# Patient Record
Sex: MALE | Race: WHITE | NOT HISPANIC OR LATINO | Employment: OTHER | URBAN - METROPOLITAN AREA
[De-identification: names, ages, dates, MRNs, and addresses within clinical notes are randomized per-mention and may not be internally consistent; named-entity substitution may affect disease eponyms.]

---

## 2018-04-08 PROBLEM — I49.1 APC (ATRIAL PREMATURE CONTRACTIONS): Status: ACTIVE | Noted: 2018-04-08

## 2018-04-08 PROBLEM — I10 HYPERTENSION, ESSENTIAL: Status: ACTIVE | Noted: 2018-04-08

## 2018-04-08 PROBLEM — I47.10 SVT (SUPRAVENTRICULAR TACHYCARDIA) (CMS/HCC): Status: ACTIVE | Noted: 2018-04-08

## 2018-04-08 PROBLEM — E78.00 HYPERCHOLESTEROLEMIA: Status: ACTIVE | Noted: 2018-04-08

## 2018-04-08 PROBLEM — I35.2 NONRHEUMATIC AORTIC INSUFFICIENCY WITH AORTIC STENOSIS: Status: ACTIVE | Noted: 2018-04-08

## 2018-04-08 PROBLEM — I25.10 CORONARY ARTERY DISEASE INVOLVING NATIVE CORONARY ARTERY OF NATIVE HEART WITHOUT ANGINA PECTORIS: Status: ACTIVE | Noted: 2018-04-08

## 2018-04-10 ENCOUNTER — OFFICE VISIT (OUTPATIENT)
Dept: CARDIOLOGY | Facility: CLINIC | Age: 73
End: 2018-04-10
Attending: INTERNAL MEDICINE
Payer: COMMERCIAL

## 2018-04-10 VITALS
WEIGHT: 157 LBS | SYSTOLIC BLOOD PRESSURE: 118 MMHG | DIASTOLIC BLOOD PRESSURE: 62 MMHG | OXYGEN SATURATION: 98 % | HEART RATE: 90 BPM | RESPIRATION RATE: 18 BRPM

## 2018-04-10 DIAGNOSIS — I25.10 CORONARY ARTERY DISEASE INVOLVING NATIVE CORONARY ARTERY OF NATIVE HEART WITHOUT ANGINA PECTORIS: ICD-10-CM

## 2018-04-10 DIAGNOSIS — E78.00 HYPERCHOLESTEROLEMIA: ICD-10-CM

## 2018-04-10 DIAGNOSIS — I35.2 NONRHEUMATIC AORTIC INSUFFICIENCY WITH AORTIC STENOSIS: ICD-10-CM

## 2018-04-10 DIAGNOSIS — I10 HYPERTENSION, ESSENTIAL: ICD-10-CM

## 2018-04-10 DIAGNOSIS — I47.10 SVT (SUPRAVENTRICULAR TACHYCARDIA) (CMS/HCC): ICD-10-CM

## 2018-04-10 PROBLEM — I35.1 AORTIC VALVE REGURGITATION: Status: ACTIVE | Noted: 2018-04-08

## 2018-04-10 PROBLEM — E78.5 DYSLIPIDEMIA: Status: ACTIVE | Noted: 2018-04-10

## 2018-04-10 PROCEDURE — 99214 OFFICE O/P EST MOD 30 MIN: CPT | Performed by: INTERNAL MEDICINE

## 2018-04-10 PROCEDURE — 93000 ELECTROCARDIOGRAM COMPLETE: CPT | Performed by: INTERNAL MEDICINE

## 2018-04-10 RX ORDER — METOPROLOL TARTRATE 25 MG/1
25 TABLET, FILM COATED ORAL
COMMUNITY
Start: 2017-04-28 | End: 2018-05-09 | Stop reason: SDUPTHER

## 2018-04-10 RX ORDER — NAPROXEN SODIUM 220 MG/1
81 TABLET, FILM COATED ORAL
COMMUNITY
Start: 2012-09-19

## 2018-04-10 RX ORDER — GLUCOSAM/CHONDRO/HERB 149/HYAL 750-100 MG
TABLET ORAL
COMMUNITY
Start: 2015-02-19

## 2018-04-10 RX ORDER — VIT C/E/ZN/COPPR/LUTEIN/ZEAXAN 250MG-90MG
CAPSULE ORAL
COMMUNITY
Start: 2015-02-19

## 2018-04-10 RX ORDER — ROSUVASTATIN CALCIUM 20 MG/1
20 TABLET, COATED ORAL
COMMUNITY
Start: 2017-04-05 | End: 2018-05-09 | Stop reason: SDUPTHER

## 2018-04-10 RX ORDER — OLMESARTAN MEDOXOMIL AND HYDROCHLOROTHIAZIDE 40/12.5 40; 12.5 MG/1; MG/1
TABLET ORAL
COMMUNITY
Start: 2017-07-18 | End: 2018-10-02 | Stop reason: SDUPTHER

## 2018-04-10 NOTE — ASSESSMENT & PLAN NOTE
Asymptomatic.  Reviewed guideline directed optimal care, medication lifestyle exercise and potential symptoms were reviewed.

## 2018-04-10 NOTE — PROGRESS NOTES
Cardiology Outpatient  Progress note    Quentin Auguste is a 72 y.o. male  who presents with coronary disease based on elevated coronary calcium score.  Also has mild aortic regurgitation, SVT noted on a stress test only never symptomatic hypertension hypercholesterolemia.  He feels well.  Active but not enough exercise.  Absolute no angina, exertional dyspnea, palpitations tachycardia.  No medication side effects.    Past medical, family, social history all reviewed and no significant interval change.    Allergies:   Lisinopril    Current Outpatient Prescriptions   Medication Sig Dispense Refill   • aspirin 81 mg chewable tablet 81 mg.     • cholecalciferol, vitamin D3, (VITAMIN D3) 1,000 unit capsule No SIG Entered     • metoprolol tartrate (LOPRESSOR) 25 mg tablet 25 mg.     • olmesartan-hydrochlorothiazide (BENICAR HCT) 40-12.5 mg per tablet take 1 tablet by oral route  every day     • omega 3-dha-epa-fish oil (FISH OIL) 1,000 mg (120 mg-180 mg) capsule No SIG Entered     • rosuvastatin (CRESTOR) 20 mg tablet 20 mg.       No current facility-administered medications for this visit.           Social History     Social History   • Marital status:      Spouse name: N/A   • Number of children: N/A   • Years of education: N/A     Social History Main Topics   • Smoking status: Never Smoker   • Smokeless tobacco: Never Used   • Alcohol use Yes      Comment: Wine, with meals   • Drug use: Unknown   • Sexual activity: Not Asked     Other Topics Concern   • None     Social History Narrative   • None       ROS  Review of Systems   Constitution: Negative for diaphoresis and fever.   HENT: Negative for hoarse voice and nosebleeds.    Eyes: Negative for blurred vision and visual disturbance.   Respiratory: Negative for hemoptysis and shortness of breath.    Endocrine: Negative for cold intolerance, heat intolerance and polydipsia.   Skin: Negative for rash.   Musculoskeletal: Negative for falls, muscle weakness and  myalgias.   Gastrointestinal: Negative for hematemesis, hematochezia and jaundice.   Genitourinary: Negative for dysuria.   Neurological: Negative for brief paralysis, light-headedness and tremors.   Psychiatric/Behavioral: Negative for altered mental status and hallucinations.     Objective     Vitals:    04/10/18 1118   BP: 118/62   Pulse:    Resp:    SpO2:        Physical Exam  Physical Exam   Constitutional: He is oriented to person, place, and time. He appears well-developed.   HENT:   Head: Normocephalic.   Mouth/Throat: Oropharynx is clear and moist.   Eyes: Right eye exhibits no discharge. Left eye exhibits no discharge. No scleral icterus.   Neck: No JVD present. No tracheal deviation present. No thyromegaly present.   Cardiovascular: Normal rate and regular rhythm.  Exam reveals no gallop and no friction rub.    No murmur heard.  Pulmonary/Chest: Effort normal and breath sounds normal. No respiratory distress. He has no wheezes. He has no rales. He exhibits no tenderness.   Abdominal: He exhibits no distension. There is no tenderness. There is no guarding.   Musculoskeletal: He exhibits no edema, tenderness or deformity.   Neurological: He is alert and oriented to person, place, and time.   Skin: Skin is warm and dry.   Psychiatric: He has a normal mood and affect.     Labs   Lab Results   Component Value Date    WBC 6.09 03/19/2015    HGB 14.8 03/19/2015    HCT 43.3 03/19/2015     03/19/2015    CHOL 130 03/19/2015    TRIG 82 03/19/2015    HDL 40 (L) 03/19/2015    ALT 29 03/19/2015    AST 27 03/19/2015     03/19/2015    K 5.0 03/19/2015     03/19/2015    CREATININE 1.0 03/19/2015    BUN 14 03/19/2015    CO2 28 03/19/2015     Labs reviewed and discussed  ECG: Sinus rhythm within normal limits my review    Problem List        Circulatory    Hypertension, essential    Overview     Overview:          Relevant Medications    olmesartan-hydrochlorothiazide (BENICAR HCT) 40-12.5 mg per tablet     metoprolol tartrate (LOPRESSOR) 25 mg tablet    Coronary artery disease involving native coronary artery of native heart without angina pectoris    Overview     2014 , 403 in LAD.  SE 9/16 unremarkable         Current Assessment & Plan     Asymptomatic.  Reviewed guideline directed optimal care, medication lifestyle exercise and potential symptoms were reviewed.         Relevant Medications    aspirin 81 mg chewable tablet    olmesartan-hydrochlorothiazide (BENICAR HCT) 40-12.5 mg per tablet    metoprolol tartrate (LOPRESSOR) 25 mg tablet    rosuvastatin (CRESTOR) 20 mg tablet    Other Relevant Orders    ECG 12 lead (Completed)    SVT (supraventricular tachycardia) (CMS/HCC) (HCC)    Overview     Brief on stress test         Current Assessment & Plan     He has never been symptomatic with this.  No intervention is needed.         Relevant Medications    aspirin 81 mg chewable tablet    olmesartan-hydrochlorothiazide (BENICAR HCT) 40-12.5 mg per tablet    metoprolol tartrate (LOPRESSOR) 25 mg tablet    rosuvastatin (CRESTOR) 20 mg tablet    Aortic valve regurgitation    Overview     Mild 2016    Overview:          Current Assessment & Plan     This can be checked again next year by echo.  Discussed.         Relevant Medications    aspirin 81 mg chewable tablet    olmesartan-hydrochlorothiazide (BENICAR HCT) 40-12.5 mg per tablet    metoprolol tartrate (LOPRESSOR) 25 mg tablet    rosuvastatin (CRESTOR) 20 mg tablet       Endocrine/Metabolic    Hypercholesterolemia    Current Assessment & Plan     This has been controlled.  Diet reviewed, Mediterranean type diet and continue rosuvastatin and check labs         Relevant Medications    omega 3-dha-epa-fish oil (FISH OIL) 1,000 mg (120 mg-180 mg) capsule    rosuvastatin (CRESTOR) 20 mg tablet            This patient note has been dictated using speech recognition software. Inadvertent speech recognition errors should be disregarded. Please do not hesitate to  call my office for clarifications.    Carlos Manuel Peña, DO  4/10/2018 1:32 PM

## 2018-04-10 NOTE — ASSESSMENT & PLAN NOTE
This has been controlled.  Diet reviewed, Mediterranean type diet and continue rosuvastatin and check labs

## 2018-05-09 RX ORDER — METOPROLOL TARTRATE 25 MG/1
25 TABLET, FILM COATED ORAL 2 TIMES DAILY
Qty: 180 TABLET | Refills: 3 | Status: SHIPPED | OUTPATIENT
Start: 2018-05-09 | End: 2019-05-30 | Stop reason: SDUPTHER

## 2018-05-09 RX ORDER — ROSUVASTATIN CALCIUM 20 MG/1
20 TABLET, COATED ORAL DAILY
Qty: 90 TABLET | Refills: 3 | Status: SHIPPED | OUTPATIENT
Start: 2018-05-09 | End: 2019-05-30 | Stop reason: SDUPTHER

## 2018-10-02 RX ORDER — OLMESARTAN MEDOXOMIL AND HYDROCHLOROTHIAZIDE 40/12.5 40; 12.5 MG/1; MG/1
1 TABLET ORAL DAILY
Qty: 90 TABLET | Refills: 3 | Status: SHIPPED | OUTPATIENT
Start: 2018-10-02 | End: 2018-11-30 | Stop reason: SDUPTHER

## 2018-10-10 ENCOUNTER — OFFICE VISIT (OUTPATIENT)
Dept: CARDIOLOGY | Facility: CLINIC | Age: 73
End: 2018-10-10
Payer: COMMERCIAL

## 2018-10-10 VITALS
SYSTOLIC BLOOD PRESSURE: 148 MMHG | DIASTOLIC BLOOD PRESSURE: 65 MMHG | OXYGEN SATURATION: 99 % | WEIGHT: 156 LBS | HEART RATE: 77 BPM

## 2018-10-10 DIAGNOSIS — E78.00 HYPERCHOLESTEROLEMIA: ICD-10-CM

## 2018-10-10 DIAGNOSIS — I49.1 APC (ATRIAL PREMATURE CONTRACTIONS): ICD-10-CM

## 2018-10-10 DIAGNOSIS — I25.10 CORONARY ARTERY DISEASE INVOLVING NATIVE CORONARY ARTERY OF NATIVE HEART WITHOUT ANGINA PECTORIS: ICD-10-CM

## 2018-10-10 DIAGNOSIS — I10 HYPERTENSION, ESSENTIAL: ICD-10-CM

## 2018-10-10 DIAGNOSIS — I35.1 NONRHEUMATIC AORTIC VALVE INSUFFICIENCY: Primary | ICD-10-CM

## 2018-10-10 DIAGNOSIS — R07.89 ATYPICAL CHEST PAIN: ICD-10-CM

## 2018-10-10 DIAGNOSIS — I47.10 SVT (SUPRAVENTRICULAR TACHYCARDIA) (CMS/HCC): ICD-10-CM

## 2018-10-10 PROCEDURE — 99214 OFFICE O/P EST MOD 30 MIN: CPT | Performed by: INTERNAL MEDICINE

## 2018-10-10 NOTE — PROGRESS NOTES
Cardiology Outpatient  Progress note    Quentin Auguste is a 73 y.o. male  who presents for ongoing cardiac care.  His complaint is that he has some chest discomfort usually left side nonradiating without dyspnea often with exertion but not always.  When he last admitted to.  Otherwise he feels well.  No medication side effects.  No recent labs.    Past medical, family, and social history reviewed and has been no significant interval change.    Allergies:   Lisinopril    Current Outpatient Prescriptions   Medication Sig Dispense Refill   • aspirin 81 mg chewable tablet 81 mg.     • cholecalciferol, vitamin D3, (VITAMIN D3) 1,000 unit capsule No SIG Entered     • metoprolol tartrate (LOPRESSOR) 25 mg tablet Take 1 tablet (25 mg total) by mouth 2 (two) times a day. 180 tablet 3   • olmesartan-hydrochlorothiazide (BENICAR HCT) 40-12.5 mg per tablet Take 1 tablet by mouth daily. 90 tablet 3   • omega 3-dha-epa-fish oil (FISH OIL) 1,000 mg (120 mg-180 mg) capsule No SIG Entered     • rosuvastatin (CRESTOR) 20 mg tablet Take 1 tablet (20 mg total) by mouth daily. 90 tablet 3     No current facility-administered medications for this visit.           Social History     Social History   • Marital status:      Spouse name: N/A   • Number of children: N/A   • Years of education: N/A     Social History Main Topics   • Smoking status: Never Smoker   • Smokeless tobacco: Never Used   • Alcohol use Yes      Comment: Wine, with meals   • Drug use: Unknown   • Sexual activity: Not Asked     Other Topics Concern   • None     Social History Narrative   • None        History reviewed. No pertinent family history.    ROS  Review of Systems   Constitution: Negative for diaphoresis and fever.   HENT: Negative for hoarse voice and nosebleeds.    Eyes: Negative for blurred vision and visual disturbance.   Respiratory: Negative for hemoptysis and shortness of breath.    Endocrine: Negative for cold intolerance, heat intolerance and  polydipsia.   Skin: Negative for rash.   Musculoskeletal: Negative for falls, muscle weakness and myalgias.   Gastrointestinal: Negative for hematemesis, hematochezia and jaundice.   Genitourinary: Negative for dysuria.   Neurological: Negative for brief paralysis, light-headedness and tremors.   Psychiatric/Behavioral: Negative for altered mental status and hallucinations.     Objective     Vitals:    10/10/18 1146   BP: (!) 148/65   Pulse: 77   SpO2: 99%       Physical Exam  Physical Exam   Constitutional: He is oriented to person, place, and time. He appears well-developed.   HENT:   Head: Normocephalic.   Mouth/Throat: Oropharynx is clear and moist.   Eyes: Right eye exhibits no discharge. Left eye exhibits no discharge. No scleral icterus.   Neck: No JVD present. No tracheal deviation present. No thyromegaly present.   Cardiovascular: Normal rate and regular rhythm.  Exam reveals no gallop and no friction rub.    No murmur heard.  Pulmonary/Chest: Effort normal and breath sounds normal. No respiratory distress. He has no wheezes. He has no rales. He exhibits no tenderness.   Abdominal: He exhibits no distension. There is no tenderness. There is no guarding.   Musculoskeletal: He exhibits no edema, tenderness or deformity.   Neurological: He is alert and oriented to person, place, and time.   Skin: Skin is warm and dry.   Psychiatric: He has a normal mood and affect.     Labs   Lab Results   Component Value Date    WBC 6.09 03/19/2015    HGB 14.8 03/19/2015    HCT 43.3 03/19/2015     03/19/2015    CHOL 130 03/19/2015    TRIG 82 03/19/2015    HDL 40 (L) 03/19/2015    ALT 29 03/19/2015    AST 27 03/19/2015     03/19/2015    K 5.0 03/19/2015     03/19/2015    CREATININE 1.0 03/19/2015    BUN 14 03/19/2015    CO2 28 03/19/2015         Problem List Items Addressed This Visit        Cardiology Problems    Coronary artery disease involving native coronary artery of native heart without angina  pectoris    Overview     2014 , 403 in LAD.  SE 9/16 unremarkable         Current Assessment & Plan     No classic angina but as below atypical pain.  Stress echo due to the atypical pain.         Relevant Orders    Echocardiogram stress test    Comprehensive metabolic panel    Lipid panel    APC (atrial premature contractions)    Current Assessment & Plan     Asymptomatic.  No intervention.         Hypercholesterolemia    Current Assessment & Plan     Same meds and check labs.  Diet reviewed.         Relevant Orders    Comprehensive metabolic panel    Lipid panel    SVT (supraventricular tachycardia) (CMS/HCC) (HCC)    Overview     Brief on stress test         Current Assessment & Plan     No symptoms of this.  No intervention.         Nonrheumatic aortic valve insufficiency - Primary    Overview     Mild 2016    Overview:          Current Assessment & Plan     This can be reevaluated when he has his stress echo.            Other    Hypertension, essential    Overview     Overview:          Current Assessment & Plan     Systolic mildly elevated today 145.  Recheck when he returns.         Atypical chest pain    Current Assessment & Plan     Chest discomfort less than a minute sometimes with exertion sometimes without exertion.  This is new.  Atypical for coronary disease but not out of the question is calcium score is elevated at 405 of which 403 is all in the LAD.  Therefore given LAD disease will check a stress echo and reevaluate.  Reviewed potential symptoms and when to call 911.         Relevant Orders    Echocardiogram stress test          This patient note has been dictated using speech recognition software. Inadvertent speech recognition errors should be disregarded. Please do not hesitate to call my office for clarifications.    Carlos Manuel Peña,   10/10/2018 5:38 PM

## 2018-10-10 NOTE — ASSESSMENT & PLAN NOTE
Chest discomfort less than a minute sometimes with exertion sometimes without exertion.  This is new.  Atypical for coronary disease but not out of the question is calcium score is elevated at 405 of which 403 is all in the LAD.  Therefore given LAD disease will check a stress echo and reevaluate.  Reviewed potential symptoms and when to call 911.

## 2018-10-10 NOTE — LETTER
October 10, 2018                                                                                                                                                                                                                                                                                                               Humberto Lopez MD  [address]  [address]  [facility] [id number]    Patient: Quentin Auguste   YOB: 1945   Date of Visit: 10/10/2018       Dear Dr. Lopez:    Thank you for referring Quentin Auguste to me for evaluation. Below are the relevant portions of my assessment and plan of care.    If you have questions, please do not hesitate to call me. I look forward to following Quentin along with you.         Sincerely,        Carlos Manuel Peña, DO        CC: No Recipients        Assessment and Plan:  Problem List Items Addressed This Visit        Cardiology Problems    Coronary artery disease involving native coronary artery of native heart without angina pectoris     No classic angina but as below atypical pain.  Stress echo due to the atypical pain.         Relevant Orders    Echocardiogram stress test    Comprehensive metabolic panel    Lipid panel    APC (atrial premature contractions)     Asymptomatic.  No intervention.         Hypercholesterolemia     Same meds and check labs.  Diet reviewed.         Relevant Orders    Comprehensive metabolic panel    Lipid panel    SVT (supraventricular tachycardia) (CMS/HCC) (HCC)     No symptoms of this.  No intervention.         Nonrheumatic aortic valve insufficiency - Primary     This can be reevaluated when he has his stress echo.            Other    Hypertension, essential     Systolic mildly elevated today 145.  Recheck when he returns.         Atypical chest pain     Chest discomfort less than a minute sometimes with exertion sometimes without exertion.  This is new.  Atypical for coronary disease but not out of the question is calcium score is  elevated at 405 of which 403 is all in the LAD.  Therefore given LAD disease will check a stress echo and reevaluate.  Reviewed potential symptoms and when to call 911.         Relevant Orders    Echocardiogram stress test

## 2018-10-31 ENCOUNTER — HOSPITAL ENCOUNTER (OUTPATIENT)
Dept: CARDIOLOGY | Facility: CLINIC | Age: 73
Discharge: HOME | End: 2018-10-31
Payer: COMMERCIAL

## 2018-10-31 VITALS — WEIGHT: 156 LBS | HEIGHT: 67 IN | BODY MASS INDEX: 24.48 KG/M2

## 2018-10-31 DIAGNOSIS — R07.89 ATYPICAL CHEST PAIN: ICD-10-CM

## 2018-10-31 DIAGNOSIS — I25.10 CORONARY ARTERY DISEASE INVOLVING NATIVE CORONARY ARTERY OF NATIVE HEART WITHOUT ANGINA PECTORIS: ICD-10-CM

## 2018-10-31 PROCEDURE — 93350 STRESS TTE ONLY: CPT | Performed by: INTERNAL MEDICINE

## 2018-11-01 LAB
AV PEAK GRADIENT: 6 MMHG
AV PEAK VELOCITY-S: 1.25 M/S
AV REG PEAK VEL: 3.24 M/S
AV REGURGITATION PRESSURE HALF TIME: 449 MS
BSA FOR ECHO PROCEDURE: 1.82 M2
E WAVE DECELERATION TIME: 169 MS
E/A RATIO: 1.2
E/E' RATIO: 8.1
E/LAT E' RATIO: 8.1
EDV (BP): 62.2 CM3
EF (A4C): 65.5 %
EF A2C: 69.3 %
EJECTION FRACTION: 68.6 %
ESV (BP): 19.5 CM3
LA ESV INDEX (A2C): 17.14 CM3/M2
LAAS-AP2: 13.4 CM2
LAAS-AP4: 10.4 CM2
LALD A4C: 3.57 CM
LALD A4C: 4.59 CM
LAV-S: 31.2 CM3
LEFT ATRIUM VOLUME INDEX: 12.53 CM3/M2
LEFT ATRIUM VOLUME: 22.8 CM3
LEFT VENTRICLE DIASTOLIC VOLUME INDEX: 29.29 CM3/M2
LEFT VENTRICLE DIASTOLIC VOLUME: 53.3 CM3
LEFT VENTRICLE SYSTOLIC VOLUME INDEX: 10.11 CM3/M2
LEFT VENTRICLE SYSTOLIC VOLUME: 18.4 CM3
LV DIASTOLIC VOLUME: 67.1 CM3
LV ESV (APICAL 2 CHAMBER): 20.6 CM3
LVAD-AP2: 23.9 CM2
LVAD-AP4: 20.7 CM2
LVAS-AP2: 11.6 CM2
LVAS-AP4: 10.6 CM2
LVEDVI(A2C): 36.87 CM3/M2
LVEDVI(BP): 34.18 CM3/M2
LVESVI(A2C): 11.32 CM3/M2
LVESVI(BP): 10.71 CM3/M2
LVLD-AP2: 7.17 CM
LVLD-AP4: 6.61 CM
LVLS-AP2: 5.53 CM
LVLS-AP4: 5.43 CM
LVOT PEAK VELOCITY: 0.94 M/S
LVOT PG: 4 MMHG
MV E'TISSUE VEL-LAT: 0.08 M/S
MV E'TISSUE VEL-MED: 0.08 M/S
MV PEAK A VEL: 0.56 M/S
MV PEAK E VEL: 0.67 M/S
RVOT VMAX: 0.74 M/S
STRESS ANGINA INDEX: 0
STRESS BASELINE BP: NORMAL MMHG
STRESS BASELINE HR: 66 BPM
STRESS PERCENT HR: 96 %
STRESS POST ESTIMATED WORKLOAD: 7 METS
STRESS POST EXERCISE DUR MIN: 5 MIN
STRESS POST EXERCISE DUR SEC: 30 SEC
STRESS POST PEAK BP: NORMAL MMHG
STRESS POST PEAK HR: 140 BPM
STRESS TARGET HR: 124 BPM
TR MAX PG: 23 MMHG
TRICUSPID VALVE PEAK REGURGITATION VELOCITY: 2.4 M/S
TV REST PULMONARY ARTERY PRESSURE: 28 MMHG

## 2018-11-30 RX ORDER — OLMESARTAN MEDOXOMIL AND HYDROCHLOROTHIAZIDE 40/12.5 40; 12.5 MG/1; MG/1
1 TABLET ORAL DAILY
Qty: 90 TABLET | Refills: 3 | Status: SHIPPED | OUTPATIENT
Start: 2018-11-30 | End: 2019-05-30 | Stop reason: SDUPTHER

## 2019-04-30 ENCOUNTER — OFFICE VISIT (OUTPATIENT)
Dept: CARDIOLOGY | Facility: CLINIC | Age: 74
End: 2019-04-30
Payer: COMMERCIAL

## 2019-04-30 VITALS
DIASTOLIC BLOOD PRESSURE: 78 MMHG | WEIGHT: 155 LBS | HEIGHT: 67 IN | BODY MASS INDEX: 24.33 KG/M2 | SYSTOLIC BLOOD PRESSURE: 134 MMHG | OXYGEN SATURATION: 98 % | HEART RATE: 86 BPM

## 2019-04-30 DIAGNOSIS — I47.10 SVT (SUPRAVENTRICULAR TACHYCARDIA) (CMS/HCC): ICD-10-CM

## 2019-04-30 DIAGNOSIS — R07.89 ATYPICAL CHEST PAIN: ICD-10-CM

## 2019-04-30 DIAGNOSIS — R55 SYNCOPE AND COLLAPSE: ICD-10-CM

## 2019-04-30 DIAGNOSIS — E78.00 HYPERCHOLESTEROLEMIA: ICD-10-CM

## 2019-04-30 DIAGNOSIS — I25.10 CORONARY ARTERY DISEASE INVOLVING NATIVE CORONARY ARTERY OF NATIVE HEART WITHOUT ANGINA PECTORIS: Primary | ICD-10-CM

## 2019-04-30 DIAGNOSIS — I10 HYPERTENSION, ESSENTIAL: ICD-10-CM

## 2019-04-30 DIAGNOSIS — R94.31 ABNORMAL EKG: ICD-10-CM

## 2019-04-30 DIAGNOSIS — I49.1 APC (ATRIAL PREMATURE CONTRACTIONS): ICD-10-CM

## 2019-04-30 DIAGNOSIS — I35.1 NONRHEUMATIC AORTIC VALVE INSUFFICIENCY: ICD-10-CM

## 2019-04-30 PROCEDURE — 93000 ELECTROCARDIOGRAM COMPLETE: CPT | Performed by: INTERNAL MEDICINE

## 2019-04-30 PROCEDURE — 99214 OFFICE O/P EST MOD 30 MIN: CPT | Performed by: INTERNAL MEDICINE

## 2019-04-30 ASSESSMENT — ENCOUNTER SYMPTOMS
IRREGULAR HEARTBEAT: 0
HEMATURIA: 0
HEMATEMESIS: 0
SYNCOPE: 0
WEAKNESS: 0
ORTHOPNEA: 0
JAUNDICE: 0
DYSURIA: 0
INSOMNIA: 0
LIGHT-HEADEDNESS: 0
ABDOMINAL PAIN: 0
MYALGIAS: 0
HEMOPTYSIS: 0
ALTERED MENTAL STATUS: 0
VERTIGO: 0
WHEEZING: 0
DIAPHORESIS: 0
FEVER: 0
SHORTNESS OF BREATH: 0
NERVOUS/ANXIOUS: 0
HOARSE VOICE: 0
DYSPNEA ON EXERTION: 0
NAIL CHANGES: 0
BLURRED VISION: 0
COLOR CHANGE: 0
HEMATOCHEZIA: 0
PND: 0
PALPITATIONS: 0
SPUTUM PRODUCTION: 0
NEAR-SYNCOPE: 0

## 2019-04-30 NOTE — PROGRESS NOTES
CARDIOLOGY OUTPATIENT PROGRESS NOTE    Quentin Auguste is a 73 y.o. male  who presents with ongoing cardiac care.  He has a significant history of HTN, CAD, hypercholesterolemia, mild AI & syncope.  He has been feeling generally well beside recovering from a recent sinus infection.  He denies chest pain/angina, shortness of breath, palpitations or dizziness.  He did have a syncopal episode with collapse about 2 months ago while at [employer].  He went to [facility] and was diagnosed with dehydration per patient (will try to obtain records).  He states a similar episode happened about one year ago as well.  He does not have a regular exercise routine but wants to start exercising regularly.  He tries to maintain a healthy diet.    Today on exam his blood pressure and heart rate are well controlled.  His EKG shows possible septal infarct which is changed since 4/2018.  Reviewed his stress echo from 2018 which demonstrated no wall motion abnormalities.    The following have been reviewed and updated as appropriate in this visit:  Tobacco  Allergies  Meds  Problems  Med Hx  Surg Hx  Fam Hx  Soc Hx      Past Medical History:   Diagnosis Date   • APC (atrial premature contractions) 4/8/2018   • Atypical chest pain 10/10/2018   • Coronary artery disease involving native coronary artery of native heart without angina pectoris 4/8/2018 2014 , 403 in LAD.  SE 9/16 unremarkable   • Hypercholesterolemia 4/8/2018   • Hypertension, essential 4/8/2018    Overview:    • Nonrheumatic aortic valve insufficiency 4/8/2018    Mild 2016  Overview:    • SVT (supraventricular tachycardia) (CMS/HCC) (HCC) 4/8/2018    Brief on stress test   • Syncope and collapse 4/30/2019       : History reviewed. No pertinent surgical history.    ALLERGIES   Lisinopril    Current Outpatient Prescriptions   Medication Sig Dispense Refill   • aspirin 81 mg chewable tablet 81 mg.     • cholecalciferol, vitamin D3, (VITAMIN D3) 1,000 unit  capsule No SIG Entered     • metoprolol tartrate (LOPRESSOR) 25 mg tablet Take 1 tablet (25 mg total) by mouth 2 (two) times a day. 180 tablet 3   • olmesartan-hydrochlorothiazide (BENICAR HCT) 40-12.5 mg per tablet Take 1 tablet by mouth daily. 90 tablet 3   • omega 3-dha-epa-fish oil (FISH OIL) 1,000 mg (120 mg-180 mg) capsule No SIG Entered     • rosuvastatin (CRESTOR) 20 mg tablet Take 1 tablet (20 mg total) by mouth daily. 90 tablet 3     No current facility-administered medications for this visit.           Social History     Social History   • Marital status:      Spouse name: N/A   • Number of children: N/A   • Years of education: N/A     Social History Main Topics   • Smoking status: Never Smoker   • Smokeless tobacco: Never Used   • Alcohol use Yes      Comment: Wine, with meals   • Drug use: No   • Sexual activity: Not Asked     Other Topics Concern   • None     Social History Narrative   • None          Family History   Problem Relation Age of Onset   • Heart failure Father        Review of Systems   Constitution: Negative for diaphoresis, fever, weakness and malaise/fatigue.   HENT: Negative for hoarse voice and nosebleeds.    Eyes: Negative for blurred vision and visual disturbance.   Cardiovascular: Negative for chest pain, cyanosis, dyspnea on exertion, irregular heartbeat, leg swelling, near-syncope, orthopnea, palpitations, paroxysmal nocturnal dyspnea and syncope.   Respiratory: Negative for hemoptysis, shortness of breath, sputum production and wheezing.    Endocrine: Negative for cold intolerance and heat intolerance.   Hematologic/Lymphatic: Negative for bleeding problem.   Skin: Negative for color change and nail changes.   Musculoskeletal: Negative for muscle weakness and myalgias.   Gastrointestinal: Negative for abdominal pain, hematemesis, hematochezia and jaundice.   Genitourinary: Negative for dysuria and hematuria.   Neurological: Negative for light-headedness and vertigo.  "  Psychiatric/Behavioral: Negative for altered mental status. The patient does not have insomnia and is not nervous/anxious.        Objective     /78 (BP Location: Right upper arm, Patient Position: Sitting)   Pulse 86   Ht 1.702 m (5' 7\")   Wt 70.3 kg (155 lb)   SpO2 98%   BMI 24.28 kg/m²     Wt Readings from Last 3 Encounters:   04/30/19 70.3 kg (155 lb)   10/31/18 70.8 kg (156 lb)   10/10/18 70.8 kg (156 lb)       Physical Exam   Constitutional: He is oriented to person, place, and time. He appears well-developed and well-nourished. No distress.   HENT:   Head: Normocephalic.   Neck: Normal range of motion.   Cardiovascular: Normal rate, regular rhythm and normal heart sounds.    Pulmonary/Chest: Breath sounds normal. No respiratory distress. He has no wheezes. He exhibits no tenderness.   Abdominal: Soft. He exhibits no distension. There is no tenderness.   Musculoskeletal: Normal range of motion.   Neurological: He is alert and oriented to person, place, and time.   Skin: Skin is warm and dry. He is not diaphoretic.   Psychiatric: He has a normal mood and affect.        LABS  Lab Results   Component Value Date    WBC 6.09 03/19/2015    HGB 14.8 03/19/2015    HCT 43.3 03/19/2015     03/19/2015    CHOL 130 03/19/2015    TRIG 82 03/19/2015    LDLCALC 74 03/19/2015    HDL 40 (L) 03/19/2015    ALT 29 03/19/2015    AST 27 03/19/2015     03/19/2015    K 5.0 03/19/2015     03/19/2015    CREATININE 1.0 03/19/2015    BUN 14 03/19/2015    CO2 28 03/19/2015    GLUCOSE 94 03/19/2015       IMAGING/PROCEDURES  Echo stress with continuous EKG monitor 10/31/19  · Stress ECG does not meet criteria for ischemia. Post-stress echocardiogram does not meet criteria for ischemia. All ventricular walls become hyperdynamic and the ejection fraction improves.  · The patient exercised 5:30 min, without angina, on a Aj protocol, acheiving 7 METS.  · Normal cavity size. Normal wall thickness. Preserved " systolic function. Estimated EF = 65- 70%. No regional wall motion abnormalities. Normal diastolic filling pattern for age.  · Mild aortic valve regurgitation.  · Trace tricuspid valve regurgitation.  · Estimated pulmonary artery pressure = 28.00 mmHg       ECG [id number]      Problem List Items Addressed This Visit     Hypertension, essential    Overview     Overview:          Current Assessment & Plan     Blood pressure well controlled on exam today.  Continue current medical regimen.  Reviewed sodium restriction, weight loss, maintaining ideal BMI and regular exercise program as physiologic means to help achieve blood pressure control.          Coronary artery disease involving native coronary artery of native heart without angina pectoris - Primary    Overview      , 403 in LAD.  SE  unremarkable         Current Assessment & Plan     No anginal symptoms, he does have abnormal EKG, see section above, continue current medical regimen.    Check echo next available appt.          Relevant Orders    ECG 12 lead (Completed)    APC (atrial premature contractions)    Relevant Orders    ECG 12 lead (Completed)    Hypercholesterolemia    Current Assessment & Plan     Most recent LDL 87 from 10/2018. Continue intensive lipid-lowering medical and lifestyle changes with LDL goal less than 70 mg/dL. Appropriate medical therapy discussed. Improve diet with Mediterranean type or DASH diet plan and exercise patterns to aid in management.          SVT (supraventricular tachycardia) (CMS/HCC) (HCC)    Overview     Brief on stress test         Current Assessment & Plan     Syncope apparently from dehydration about 2 months ago.  Also had another episode about 1 year ago.  Patient reports no obvious palpitations or skipped beats.      Will check 24 hr holter.  May require loop if syncope occurs again.  Check echo.           Relevant Orders    ECG 12 lead (Completed)    Nonrheumatic aortic valve insufficiency     Overview     Mild 2016    Overview:          Current Assessment & Plan     Echo in next couple weeks to evaluate.          Relevant Orders    ECG 12 lead (Completed)    Atypical chest pain    Current Assessment & Plan     No further chest discomfort.  Last stress echo in 2018 unremarkable however EKG demonstrates possible septal infarct which has changed from last year.    Check echo         Syncope and collapse    Current Assessment & Plan     syncopal episode with collapse about 2 months ago while at [employer].  He went to [facility] and was diagnosed with dehydration per patient (will try to obtain records).  He states a similar episode happened about one year ago as well.  He admits to not drinking enough water on a daily basis.      This could be dehydration however he did have some SVT in the past.  Will check 24 holter monitor.  If he experiences any further symptoms may require more long term holter or loop recorder.  Increase fluid intake as well.          Relevant Orders    Clinic Holter Monitor - 24 hour    Abnormal EKG    Current Assessment & Plan     EKG shows possible septal infarct which is changed since 4/2018.  Reviewed his stress echo from 2018 which demonstrated no wall motion abnormalities.  We can check echo at next available appointment to assess wall motion.  He denies any symptoms.  Lead placement could always be culprit.  Discussed signs and symptoms of MI and when to seek immediate medical attention.            Relevant Orders    Transthoracic echo (TTE) complete          Lupe PONCE am scribing for, and in the presence of Carlos Manuel Peña DO.  Carlos Manuel PONCE DO, personally performed the services described in this documentation as scribed by Lupe Nation in my presence, and it is both accurate and complete.    This patient note has been dictated using speech recognition software. Inadvertent speech recognition errors should be disregarded. Please do not hesitate to call my office  for clarifications.    STEFANIE Abel  4/30/2019 11:43 AM

## 2019-04-30 NOTE — ASSESSMENT & PLAN NOTE
No anginal symptoms, he does have abnormal EKG, see section above, continue current medical regimen.    Check echo next available appt.

## 2019-04-30 NOTE — ASSESSMENT & PLAN NOTE
syncopal episode with collapse about 2 months ago while at [employer].  He went to [facility] and was diagnosed with dehydration per patient (will try to obtain records).  He states a similar episode happened about one year ago as well.  He admits to not drinking enough water on a daily basis.      This could be dehydration however he did have some SVT in the past.  Will check 24 holter monitor.  If he experiences any further symptoms may require more long term holter or loop recorder.  Increase fluid intake as well.

## 2019-04-30 NOTE — ASSESSMENT & PLAN NOTE
Most recent LDL 87 from 10/2018. Continue intensive lipid-lowering medical and lifestyle changes with LDL goal less than 70 mg/dL. Appropriate medical therapy discussed. Improve diet with Mediterranean type or DASH diet plan and exercise patterns to aid in management.

## 2019-04-30 NOTE — ASSESSMENT & PLAN NOTE
Syncope apparently from dehydration about 2 months ago.  Also had another episode about 1 year ago.  Patient reports no obvious palpitations or skipped beats.      Will check 24 hr holter.  May require loop if syncope occurs again.  Check echo.

## 2019-04-30 NOTE — LETTER
April 30, 2019                                                                                                                                                                                                                                                                                                               Humberto Lopez MD  [address]  [address]  [facility] [address]    Patient: Quentin Auguste   YOB: 1945   Date of Visit: 4/30/2019       Dear Dr. Lopez:    Thank you for referring Quentin Auguste to me for evaluation. Below are the relevant portions of my assessment and plan of care.    If you have questions, please do not hesitate to call me. I look forward to following Quentin along with you.         Sincerely,        Carlos Manuel Peña, DO        CC: No Recipients        Assessment and Plan:  Problem List Items Addressed This Visit        Cardiology Problems    Coronary artery disease involving native coronary artery of native heart without angina pectoris - Primary     No anginal symptoms, he does have abnormal EKG, see section above, continue current medical regimen.    Check echo next available appt.          Relevant Orders    ECG 12 lead (Completed)    APC (atrial premature contractions)    Relevant Orders    ECG 12 lead (Completed)    Hypercholesterolemia     Most recent LDL 87 from 10/2018. Continue intensive lipid-lowering medical and lifestyle changes with LDL goal less than 70 mg/dL. Appropriate medical therapy discussed. Improve diet with Mediterranean type or DASH diet plan and exercise patterns to aid in management.          SVT (supraventricular tachycardia) (CMS/HCC) (HCC)     Syncope apparently from dehydration about 2 months ago.  Also had another episode about 1 year ago.  Patient reports no obvious palpitations or skipped beats.      Will check 24 hr holter.  May require loop if syncope occurs again.  Check echo.           Relevant Orders    ECG 12 lead (Completed)     Nonrheumatic aortic valve insufficiency     Echo in next couple weeks to evaluate.          Relevant Orders    ECG 12 lead (Completed)       Other    Hypertension, essential     Blood pressure well controlled on exam today.  Continue current medical regimen.  Reviewed sodium restriction, weight loss, maintaining ideal BMI and regular exercise program as physiologic means to help achieve blood pressure control.          Atypical chest pain     No further chest discomfort.  Last stress echo in 2018 unremarkable however EKG demonstrates possible septal infarct which has changed from last year.    Check echo         Syncope and collapse     syncopal episode with collapse about 2 months ago while at [employer].  He went to [facility] and was diagnosed with dehydration per patient (will try to obtain records).  He states a similar episode happened about one year ago as well.  He admits to not drinking enough water on a daily basis.      This could be dehydration however he did have some SVT in the past.  Will check 24 holter monitor.  If he experiences any further symptoms may require more long term holter or loop recorder.  Increase fluid intake as well.          Relevant Orders    Clinic Holter Monitor - 24 hour    Abnormal EKG     EKG shows possible septal infarct which is changed since 4/2018.  Reviewed his stress echo from 2018 which demonstrated no wall motion abnormalities.  We can check echo at next available appointment to assess wall motion.  He denies any symptoms.  Lead placement could always be culprit.  Discussed signs and symptoms of MI and when to seek immediate medical attention.            Relevant Orders    Transthoracic echo (TTE) complete

## 2019-04-30 NOTE — ASSESSMENT & PLAN NOTE
No further chest discomfort.  Last stress echo in 2018 unremarkable however EKG demonstrates possible septal infarct which has changed from last year.    Check echo

## 2019-04-30 NOTE — ASSESSMENT & PLAN NOTE
EKG shows possible septal infarct which is changed since 4/2018.  Reviewed his stress echo from 2018 which demonstrated no wall motion abnormalities.  We can check echo at next available appointment to assess wall motion.  He denies any symptoms.  Lead placement could always be culprit.  Discussed signs and symptoms of MI and when to seek immediate medical attention.

## 2019-04-30 NOTE — ASSESSMENT & PLAN NOTE
Blood pressure well controlled on exam today.  Continue current medical regimen.  Reviewed sodium restriction, weight loss, maintaining ideal BMI and regular exercise program as physiologic means to help achieve blood pressure control.

## 2019-05-01 ENCOUNTER — TELEPHONE (OUTPATIENT)
Dept: CARDIOLOGY | Facility: CLINIC | Age: 74
End: 2019-05-01

## 2019-05-30 ENCOUNTER — HOSPITAL ENCOUNTER (OUTPATIENT)
Dept: CARDIOLOGY | Facility: CLINIC | Age: 74
Discharge: HOME | End: 2019-05-30
Attending: NURSE PRACTITIONER
Payer: COMMERCIAL

## 2019-05-30 VITALS
DIASTOLIC BLOOD PRESSURE: 78 MMHG | WEIGHT: 155 LBS | SYSTOLIC BLOOD PRESSURE: 134 MMHG | BODY MASS INDEX: 24.33 KG/M2 | HEIGHT: 67 IN

## 2019-05-30 DIAGNOSIS — R94.31 ABNORMAL EKG: ICD-10-CM

## 2019-05-30 LAB
AORTIC ROOT ANNULUS: 3.3 CM
ASCENDING AORTA: 3.4 CM
AV PEAK GRADIENT: 5 MMHG
AV PEAK VELOCITY-S: 1.11 M/S
AV REG PEAK VEL: 3.51 M/S
AV REGURGITATION PRESSURE HALF TIME: 409 MS
AV VALVE AREA: 2.84 CM2
BSA FOR ECHO PROCEDURE: 1.82 M2
CUSP SEPARATION: 1.8 CM
E WAVE DECELERATION TIME: 173 MS
E/A RATIO: 0.8
E/E' RATIO: 7.5
E/LAT E' RATIO: 10.5
EDV (BP): 82 CM3
EF (A4C): 68.4 %
EF A2C: 66.8 %
EJECTION FRACTION: 65.5 %
ESTIMATED PASP: 31 MMHG
ESV (BP): 28.3 CM3
FRACTIONAL SHORTENING: 33.1 %
INTERVENTRICULAR SEPTUM: 0.85 CM
LA ESV (BP): 40.5 CM3
LA ESV INDEX (A2C): 19.4 CM3/M2
LA ESV INDEX (BP): 22.25 CM3/M2
LA/AORTA RATIO: 1
LAAS-AP2: 14.8 CM2
LAAS-AP4: 16.7 CM2
LAD 2D: 3.3 CM
LALD A4C: 4.66 CM
LALD A4C: 4.87 CM
LAV-S: 35.3 CM3
LEFT ATRIUM VOLUME INDEX: 24.67 CM3/M2
LEFT ATRIUM VOLUME: 44.9 CM3
LEFT INTERNAL DIMENSION IN SYSTOLE: 2.77 CM (ref 2.52–3.82)
LEFT VENTRICLE DIASTOLIC VOLUME INDEX: 46.59 CM3/M2
LEFT VENTRICLE DIASTOLIC VOLUME: 84.8 CM3
LEFT VENTRICLE SYSTOLIC VOLUME INDEX: 14.78 CM3/M2
LEFT VENTRICLE SYSTOLIC VOLUME: 26.9 CM3
LEFT VENTRICULAR INTERNAL DIMENSION IN DIASTOLE: 4.14 CM (ref 4.26–5.92)
LEFT VENTRICULAR POSTERIOR WALL IN END DIASTOLE: 0.91 CM (ref 0.56–1.04)
LV DIASTOLIC VOLUME: 78.9 CM3
LV ESV (APICAL 2 CHAMBER): 26.2 CM3
LVAD-AP2: 26.7 CM2
LVAD-AP4: 27.9 CM2
LVAS-AP2: 14.2 CM2
LVAS-AP4: 13.6 CM2
LVEDVI(A2C): 43.35 CM3/M2
LVEDVI(BP): 45.05 CM3/M2
LVESVI(A2C): 14.4 CM3/M2
LVESVI(BP): 15.55 CM3/M2
LVLD-AP2: 7.71 CM
LVLD-AP4: 7.51 CM
LVLS-AP2: 6.51 CM
LVLS-AP4: 5.69 CM
LVOT 2D: 2.1 CM
LVOT A: 3.46 CM2
LVOT PEAK VELOCITY: 0.91 M/S
MV E'TISSUE VEL-LAT: 0.07 M/S
MV E'TISSUE VEL-MED: 0.09 M/S
MV PEAK A VEL: 0.88 M/S
MV PEAK E VEL: 0.7 M/S
MV VALVE AREA P 1/2 METHOD: 4.31 CM2
POSTERIOR WALL: 0.91 CM
PV PEAK GRADIENT: 7 MMHG
PV PV: 1.35 M/S
RAP: 5 MMHG
RVOT VMAX: 0.84 M/S
SEPTAL TISSUE DOPPLER FREE WALL LATE DIA VELOCITY (APICAL 4 CHAMBER VIEW): 0.15 M/S
TR MAX PG: 26 MMHG
TRICUSPID VALVE PEAK REGURGITATION VELOCITY: 2.57 M/S
Z-SCORE OF LEFT VENTRICULAR DIMENSION IN END DIASTOLE: -1.93
Z-SCORE OF LEFT VENTRICULAR DIMENSION IN END SYSTOLE: -0.89
Z-SCORE OF LEFT VENTRICULAR POSTERIOR WALL IN END DIASTOLE: 0.93

## 2019-05-30 PROCEDURE — 93306 TTE W/DOPPLER COMPLETE: CPT | Performed by: INTERNAL MEDICINE

## 2019-05-30 RX ORDER — OLMESARTAN MEDOXOMIL AND HYDROCHLOROTHIAZIDE 40/12.5 40; 12.5 MG/1; MG/1
1 TABLET ORAL DAILY
Qty: 90 TABLET | Refills: 3 | Status: SHIPPED | OUTPATIENT
Start: 2019-05-30 | End: 2020-06-19

## 2019-05-30 RX ORDER — METOPROLOL TARTRATE 25 MG/1
25 TABLET, FILM COATED ORAL 2 TIMES DAILY
Qty: 180 TABLET | Refills: 3 | Status: SHIPPED | OUTPATIENT
Start: 2019-05-30 | End: 2020-06-08

## 2019-05-30 RX ORDER — ROSUVASTATIN CALCIUM 20 MG/1
20 TABLET, COATED ORAL DAILY
Qty: 90 TABLET | Refills: 3 | Status: SHIPPED | OUTPATIENT
Start: 2019-05-30 | End: 2020-05-01 | Stop reason: SDUPTHER

## 2019-10-30 ENCOUNTER — OFFICE VISIT (OUTPATIENT)
Dept: CARDIOLOGY | Facility: CLINIC | Age: 74
End: 2019-10-30
Payer: COMMERCIAL

## 2019-10-30 VITALS
SYSTOLIC BLOOD PRESSURE: 142 MMHG | BODY MASS INDEX: 23.81 KG/M2 | HEART RATE: 81 BPM | WEIGHT: 152 LBS | DIASTOLIC BLOOD PRESSURE: 80 MMHG | OXYGEN SATURATION: 99 %

## 2019-10-30 DIAGNOSIS — E78.00 HYPERCHOLESTEROLEMIA: ICD-10-CM

## 2019-10-30 DIAGNOSIS — I35.1 NONRHEUMATIC AORTIC VALVE INSUFFICIENCY: ICD-10-CM

## 2019-10-30 DIAGNOSIS — I47.10 SVT (SUPRAVENTRICULAR TACHYCARDIA) (CMS/HCC): Primary | ICD-10-CM

## 2019-10-30 DIAGNOSIS — I10 HYPERTENSION, ESSENTIAL: ICD-10-CM

## 2019-10-30 DIAGNOSIS — Z87.898 HISTORY OF SYNCOPE: ICD-10-CM

## 2019-10-30 DIAGNOSIS — R93.1 AGATSTON CORONARY ARTERY CALCIUM SCORE GREATER THAN 400: ICD-10-CM

## 2019-10-30 DIAGNOSIS — I25.10 CORONARY ARTERY DISEASE INVOLVING NATIVE CORONARY ARTERY OF NATIVE HEART WITHOUT ANGINA PECTORIS: ICD-10-CM

## 2019-10-30 PROCEDURE — 99214 OFFICE O/P EST MOD 30 MIN: CPT | Performed by: INTERNAL MEDICINE

## 2019-10-30 ASSESSMENT — ENCOUNTER SYMPTOMS
ALTERED MENTAL STATUS: 0
SPUTUM PRODUCTION: 0
ABDOMINAL PAIN: 0
POLYDIPSIA: 0
TREMORS: 0
HALLUCINATIONS: 0
ODYNOPHAGIA: 0
FOCAL WEAKNESS: 0
DYSURIA: 0
FEVER: 0
HEMATOCHEZIA: 0
WHEEZING: 0
HEMOPTYSIS: 0
BLURRED VISION: 0
HOARSE VOICE: 0
HEMATEMESIS: 0
FALLS: 0
DIAPHORESIS: 0
BRIEF PARALYSIS: 0
MYALGIAS: 0

## 2019-10-30 NOTE — PROGRESS NOTES
Cardiology Outpatient  Progress note    Quentin Auguste is a 74 y.o. male  who presents for ongoing cardiac care.  His syncopal episode about 6 months ago and one last year.  No recurrence of syncope or near syncope.  No orthostatic symptoms.  No angina.  Does have an elevated coronary calcium score and mild valvular disease.  He said no symptoms jesting arrhythmias.  No medication side effects and states he is compliant medication.  Remains active.    Past medical, family, social history were reviewed and there has been no significant interval change.       Past Medical History:   Diagnosis Date   • APC (atrial premature contractions) 4/8/2018   • Atypical chest pain 10/10/2018   • Coronary artery disease involving native coronary artery of native heart without angina pectoris 4/8/2018 2014 , 403 in LAD.  SE 9/16 unremarkable   • Hypercholesterolemia 4/8/2018   • Hypertension, essential 4/8/2018    Overview:    • Nonrheumatic aortic valve insufficiency 4/8/2018    Mild 2016  Overview:    • SVT (supraventricular tachycardia) (CMS/HCC) 4/8/2018    Brief on stress test   • Syncope and collapse 4/30/2019       : History reviewed. No pertinent surgical history.    Allergies:   Lisinopril    Current Outpatient Medications   Medication Sig Dispense Refill   • aspirin 81 mg chewable tablet 81 mg.     • cholecalciferol, vitamin D3, (VITAMIN D3) 1,000 unit capsule No SIG Entered     • metoprolol tartrate (LOPRESSOR) 25 mg tablet Take 1 tablet (25 mg total) by mouth 2 (two) times a day. 180 tablet 3   • olmesartan-hydrochlorothiazide (BENICAR HCT) 40-12.5 mg per tablet Take 1 tablet by mouth daily. 90 tablet 3   • omega 3-dha-epa-fish oil (FISH OIL) 1,000 mg (120 mg-180 mg) capsule No SIG Entered     • rosuvastatin (CRESTOR) 20 mg tablet Take 1 tablet (20 mg total) by mouth daily. 90 tablet 3     No current facility-administered medications for this visit.           Social History     Socioeconomic History   • Marital  status:      Spouse name: None   • Number of children: None   • Years of education: None   • Highest education level: None   Occupational History   • None   Social Needs   • Financial resource strain: None   • Food insecurity:     Worry: None     Inability: None   • Transportation needs:     Medical: None     Non-medical: None   Tobacco Use   • Smoking status: Never Smoker   • Smokeless tobacco: Never Used   Substance and Sexual Activity   • Alcohol use: Yes     Comment: Wine, with meals   • Drug use: No   • Sexual activity: None   Lifestyle   • Physical activity:     Days per week: None     Minutes per session: None   • Stress: None   Relationships   • Social connections:     Talks on phone: None     Gets together: None     Attends Faith service: None     Active member of club or organization: None     Attends meetings of clubs or organizations: None     Relationship status: None   • Intimate partner violence:     Fear of current or ex partner: None     Emotionally abused: None     Physically abused: None     Forced sexual activity: None   Other Topics Concern   • None   Social History Narrative   • None          Family History   Problem Relation Age of Onset   • Heart failure Biological Father        Review of Systems   Constitution: Negative for diaphoresis and fever.   HENT: Negative for hoarse voice and odynophagia.    Eyes: Negative for blurred vision and visual disturbance.   Respiratory: Negative for hemoptysis, sputum production and wheezing.    Endocrine: Negative for cold intolerance, heat intolerance and polydipsia.   Skin: Negative for rash.   Musculoskeletal: Negative for falls, muscle weakness and myalgias.   Gastrointestinal: Negative for abdominal pain, hematemesis and hematochezia.   Genitourinary: Negative for dysuria.   Neurological: Negative for brief paralysis, focal weakness and tremors.   Psychiatric/Behavioral: Negative for altered mental status and hallucinations.       Objective      Visit Vitals  BP (!) 142/80   Pulse 81   Wt 68.9 kg (152 lb)   SpO2 99%   BMI 23.81 kg/m²     Wt Readings from Last 3 Encounters:   10/30/19 68.9 kg (152 lb)   05/30/19 70.3 kg (155 lb)   04/30/19 70.3 kg (155 lb)         Physical Exam   Constitutional: He is oriented to person, place, and time. He appears well-developed.   HENT:   Head: Atraumatic.   Mouth/Throat: Oropharynx is clear and moist.   Eyes: Conjunctivae are normal. No scleral icterus.   Neck: No JVD present. Carotid bruit is not present. No tracheal deviation present. No thyromegaly present.   Cardiovascular: Normal rate, regular rhythm and normal heart sounds. Exam reveals no gallop and no friction rub.   No murmur heard.  Pulmonary/Chest: Effort normal and breath sounds normal. No respiratory distress. He has no wheezes. He has no rales.   Abdominal: Soft. Bowel sounds are normal. He exhibits no distension.   Musculoskeletal: He exhibits no edema or tenderness.   Neurological: He is alert and oriented to person, place, and time. No cranial nerve deficit.   Skin: Skin is warm and dry. He is not diaphoretic.   Psychiatric: He has a normal mood and affect.   Vitals reviewed.       Labs   Lab Results   Component Value Date    WBC 6.09 03/19/2015    HGB 14.8 03/19/2015    HCT 43.3 03/19/2015     03/19/2015    CHOL 130 03/19/2015    TRIG 82 03/19/2015    HDL 40 (L) 03/19/2015    LDLCALC 74 03/19/2015    ALT 29 03/19/2015    AST 27 03/19/2015     03/19/2015    K 5.0 03/19/2015     03/19/2015    CREATININE 1.0 03/19/2015    BUN 14 03/19/2015    CO2 28 03/19/2015    GLUCOSE 94 03/19/2015       Imaging    Cardiac Imaging   TRANSTHORACIC ECHO (TTE) COMPLETE 05/30/2019    Narrative · Normal-sized left ventricular cavity. Normal left ventricular wall   thickness. Preserved systolic function. Estimated EF = 65- 70%. Grade I   diastolic dysfunction.Normal septal wall motion. No regional wall motion   abnormalities.  · Normal aortic valve  structure. Tricuspid aortic valve. Mild aortic valve   regurgitation. No significant aortic valve stenosis.  · Aortic root normal.  · Normal leaflet structure and normal leaflet motion of the mitral valve.   Trace mitral valve regurgitation.  · Normal-sized left atrium.  · Normal-sized right ventricular cavity with preserved systolic function.  · Tricuspid valve structure is normal. Trace tricuspid valve   regurgitation.  · Normal size IVC and collapses >50% during inspiration.  · No evidence of pericardial effusion.  · Compared to the previous echocardiogram from October 2018 no significant   change.  · At the completion of the test I discussed the results and follow-up with   Mr. Auguste.          ECG EKG:      Problem List Items Addressed This Visit        Cardiology Problems    Agatston coronary artery calcium score greater than 400    Overview     2014 , 403 in LAD.  SE 9/16 unremarkable         Current Assessment & Plan     Fortunately remains asymptomatic with a negative stress echo.  Reviewed potential symptoms and appropriate action guideline directed therapy also reviewed.         SVT (supraventricular tachycardia) (CMS/HCC) - Primary    Overview     Brief on stress test         Current Assessment & Plan     No symptoms to suggest recurrence.  No intervention.         Hypercholesterolemia    Current Assessment & Plan     Continue rosuvastatin and Mediterranean diet.  Will recheck labs.         Nonrheumatic aortic valve insufficiency    Overview     Mild 2016    Overview:             Other    Hypertension, essential    Overview     Overview:          Current Assessment & Plan     Mildly elevated but usually lower.  No change today but will need to adjust meds if it remains mildly elevated.         History of syncope    Overview     2/19 and once in 2018 ? etiology         Current Assessment & Plan     No recurrent episodes.  Hydration and question avoid orthostasis reviewed.               This patient  note has been dictated using speech recognition software. Inadvertent speech recognition errors should be disregarded. Please do not hesitate to call my office for clarifications.    Carlos Manuel Peña,   10/30/2019 3:27 PM

## 2019-10-30 NOTE — ASSESSMENT & PLAN NOTE
Mildly elevated but usually lower.  No change today but will need to adjust meds if it remains mildly elevated.

## 2019-10-30 NOTE — ASSESSMENT & PLAN NOTE
Fortunately remains asymptomatic with a negative stress echo.  Reviewed potential symptoms and appropriate action guideline directed therapy also reviewed.

## 2020-01-21 ENCOUNTER — OFFICE VISIT (OUTPATIENT)
Dept: CARDIOLOGY | Facility: CLINIC | Age: 75
End: 2020-01-21
Payer: COMMERCIAL

## 2020-01-21 VITALS
BODY MASS INDEX: 24.96 KG/M2 | WEIGHT: 159 LBS | SYSTOLIC BLOOD PRESSURE: 128 MMHG | OXYGEN SATURATION: 99 % | DIASTOLIC BLOOD PRESSURE: 72 MMHG | HEIGHT: 67 IN | HEART RATE: 76 BPM

## 2020-01-21 DIAGNOSIS — I10 HYPERTENSION, ESSENTIAL: ICD-10-CM

## 2020-01-21 DIAGNOSIS — I47.10 SVT (SUPRAVENTRICULAR TACHYCARDIA) (CMS/HCC): Primary | ICD-10-CM

## 2020-01-21 DIAGNOSIS — I35.1 NONRHEUMATIC AORTIC VALVE INSUFFICIENCY: ICD-10-CM

## 2020-01-21 DIAGNOSIS — E78.00 HYPERCHOLESTEROLEMIA: ICD-10-CM

## 2020-01-21 DIAGNOSIS — R94.31 ABNORMAL EKG: ICD-10-CM

## 2020-01-21 DIAGNOSIS — Z01.810 PRE-OPERATIVE CARDIOVASCULAR EXAMINATION: ICD-10-CM

## 2020-01-21 DIAGNOSIS — R93.1 AGATSTON CORONARY ARTERY CALCIUM SCORE GREATER THAN 400: ICD-10-CM

## 2020-01-21 DIAGNOSIS — R07.89 ATYPICAL CHEST PAIN: ICD-10-CM

## 2020-01-21 DIAGNOSIS — I49.1 APC (ATRIAL PREMATURE CONTRACTIONS): ICD-10-CM

## 2020-01-21 DIAGNOSIS — Z87.898 HISTORY OF SYNCOPE: ICD-10-CM

## 2020-01-21 PROCEDURE — 93000 ELECTROCARDIOGRAM COMPLETE: CPT | Performed by: INTERNAL MEDICINE

## 2020-01-21 PROCEDURE — 99214 OFFICE O/P EST MOD 30 MIN: CPT | Performed by: INTERNAL MEDICINE

## 2020-01-21 RX ORDER — EZETIMIBE 10 MG/1
10 TABLET ORAL NIGHTLY
Qty: 90 TABLET | Refills: 3 | Status: SHIPPED | OUTPATIENT
Start: 2020-01-21 | End: 2020-12-07

## 2020-01-21 ASSESSMENT — ENCOUNTER SYMPTOMS
LIGHT-HEADEDNESS: 0
SYNCOPE: 0
MYALGIAS: 0
JAUNDICE: 0
FEVER: 0
HEMOPTYSIS: 0
ORTHOPNEA: 0
VERTIGO: 0
NAIL CHANGES: 0
DIAPHORESIS: 0
PND: 0
HEMATURIA: 0
NERVOUS/ANXIOUS: 0
DYSURIA: 0
SPUTUM PRODUCTION: 0
ABDOMINAL PAIN: 0
HEMATEMESIS: 0
HOARSE VOICE: 0
COLOR CHANGE: 0
WHEEZING: 0
DYSPNEA ON EXERTION: 0
HEMATOCHEZIA: 0
IRREGULAR HEARTBEAT: 0
INSOMNIA: 0
SHORTNESS OF BREATH: 0
PALPITATIONS: 0
BLURRED VISION: 0
NEAR-SYNCOPE: 0
ALTERED MENTAL STATUS: 0
WEAKNESS: 0

## 2020-01-21 NOTE — ASSESSMENT & PLAN NOTE
Mr. Auguste will require bilateral cataract surgery on 1/28/20 & 2/12/2020 at eye surgery center of [facility].  From a cardiac standpoint both Mr. Auguste and the procedure itself are considered low risk for cardiac complications.      He has no documented hx of MI, CVA, DM, CHF, or kidney disease.  He does have CAD via coronary calcium scoring however this is not a contraindication to the procedure especially given he his lack of symptoms.  His EKG is also normal today with no evidence of ischemia.  His functional capacity is excellent as he is able to walk over 1/2 mile without symptoms or breaks.  There for there are no contraindications to proceeding with the planned procedure without any additional cardiac testing.  Reviewed with patient that with any procedure regardless of health history of status there are potential risks for adverse cardiac complications.  He verbalizes understanding.

## 2020-01-21 NOTE — ASSESSMENT & PLAN NOTE
Well controlled on exam today.  Continue current medical regimen.  Reviewed sodium restriction, weight loss, maintaining ideal BMI and regular exercise program as physiologic means to help achieve blood pressure control.

## 2020-01-21 NOTE — LETTER
January 21, 2020     [facility]    Patient: Quentin Auguste Jr.  YOB: 1945  Date of Visit: 1/21/2020      Dear Dr. Carvajal:    Thank you for referring Quentin Auguste Jr. to me for evaluation. Below are my notes for this consultation.    If you have questions, please do not hesitate to call me. I look forward to following your patient along with you.         Sincerely,        Carlos Manuel Peña, DO        CC: No Recipients  Lupe Nation CRNP  1/21/2020  3:13 PM  Sign at close encounter  CARDIOLOGY OUTPATIENT PROGRESS NOTE    Quentin Auguste Jr. is a 74 y.o. male  who presents with pre-operative cardiac exam and ongoing cardiac care.  He has a significant history of HTN, CAD, hypercholesterolemia, mild AI & syncope.   He has been feeling generally well with no complaints of chest pain/angina, shortness of breath, orthopnea, palpitations, dizziness or syncope.  He admits to no formal exercise lately and room for improvement within his diet.  He feels motivated to get more healthy as when he was exercising last year he felt his best.    Today on exam his blood pressure and HR are well controlled.  EKG demonstrates NSR with no evidence of ischemia.    Mr. Auguste will require bilateral cataract surgery on 1/28/20 & 2/12/2020 at eye surgery center [facility].  From a cardiac standpoint both Mr. Auguste and the procedure itself are considered low risk for cardiac complications.  He has no documented hx of MI, CVA, DM, CHF, or kidney disease.  He does have CAD via coronary calcium scoring however this is not a contraindication to the procedure especially given he his lack of symptoms.  His EKG is also normal today with no evidence of ischemia.  His functional capacity is excellent as he is able to walk over 1/2 mile without symptoms or breaks.  There for there are no contraindications to proceeding with the planned procedure without any additional cardiac testing.   Reviewed with patient that with any procedure regardless of health history of status there are potential risks for adverse cardiac complications.  He verbalizes understanding.           The following have been reviewed and updated as appropriate in this visit:  Tobacco  Allergies  Meds  Problems  Med Hx  Surg Hx  Fam Hx  Soc Hx      Past Medical History:   Diagnosis Date   • APC (atrial premature contractions) 4/8/2018   • Atypical chest pain 10/10/2018   • Cataracts, bilateral    • Coronary artery disease involving native coronary artery of native heart without angina pectoris 4/8/2018 2014 , 403 in LAD.  SE 9/16 unremarkable   • Hypercholesterolemia 4/8/2018   • Hypertension, essential 4/8/2018    Overview:    • Nonrheumatic aortic valve insufficiency 4/8/2018    Mild 2016  Overview:    • SVT (supraventricular tachycardia) (CMS/HCC) 4/8/2018    Brief on stress test   • Syncope and collapse 4/30/2019       :   Past Surgical History:   Procedure Laterality Date   • BACK SURGERY  2001   • HERNIA REPAIR     • ROTATOR CUFF REPAIR Left    • TONSILLECTOMY         ALLERGIES   Lisinopril    Current Outpatient Medications   Medication Sig Dispense Refill   • aspirin 81 mg chewable tablet 81 mg.     • cholecalciferol, vitamin D3, (VITAMIN D3) 1,000 unit capsule No SIG Entered     • metoprolol tartrate (LOPRESSOR) 25 mg tablet Take 1 tablet (25 mg total) by mouth 2 (two) times a day. 180 tablet 3   • olmesartan-hydrochlorothiazide (BENICAR HCT) 40-12.5 mg per tablet Take 1 tablet by mouth daily. 90 tablet 3   • omega 3-dha-epa-fish oil (FISH OIL) 1,000 mg (120 mg-180 mg) capsule No SIG Entered     • rosuvastatin (CRESTOR) 20 mg tablet Take 1 tablet (20 mg total) by mouth daily. 90 tablet 3   • ezetimibe (ZETIA) 10 mg tablet Take 1 tablet (10 mg total) by mouth nightly. 90 tablet 3     No current facility-administered medications for this visit.           Social History     Socioeconomic History   • Marital  status:      Spouse name: None   • Number of children: None   • Years of education: None   • Highest education level: None   Occupational History   • None   Social Needs   • Financial resource strain: None   • Food insecurity:     Worry: None     Inability: None   • Transportation needs:     Medical: None     Non-medical: None   Tobacco Use   • Smoking status: Never Smoker   • Smokeless tobacco: Never Used   Substance and Sexual Activity   • Alcohol use: Yes     Comment: Wine, with meals   • Drug use: No   • Sexual activity: None   Lifestyle   • Physical activity:     Days per week: None     Minutes per session: None   • Stress: None   Relationships   • Social connections:     Talks on phone: None     Gets together: None     Attends L[employer] day service: None     Active member of club or organization: None     Attends meetings of clubs or organizations: None     Relationship status: None   • Intimate partner violence:     Fear of current or ex partner: None     Emotionally abused: None     Physically abused: None     Forced sexual activity: None   Other Topics Concern   • None   Social History Narrative   • None          Family History   Problem Relation Age of Onset   • Heart failure Biological Father        Review of Systems   Constitution: Negative for diaphoresis, fever and malaise/fatigue.   HENT: Negative for hoarse voice and nosebleeds.    Eyes: Negative for blurred vision and visual disturbance.   Cardiovascular: Negative for chest pain, cyanosis, dyspnea on exertion, irregular heartbeat, leg swelling, near-syncope, orthopnea, palpitations, paroxysmal nocturnal dyspnea and syncope.   Respiratory: Negative for hemoptysis, shortness of breath, sputum production and wheezing.    Endocrine: Negative for cold intolerance and heat intolerance.   Hematologic/Lymphatic: Negative for bleeding problem.   Skin: Negative for color change and nail changes.   Musculoskeletal: Negative for muscle weakness and myalgias.  "  Gastrointestinal: Negative for abdominal pain, hematemesis, hematochezia and jaundice.   Genitourinary: Negative for dysuria and hematuria.   Neurological: Negative for light-headedness, vertigo and weakness.   Psychiatric/Behavioral: Negative for altered mental status. The patient does not have insomnia and is not nervous/anxious.        Objective     Visit Vitals  /72 (BP Location: Left upper arm, Patient Position: Sitting)   Pulse 76   Ht 1.702 m (5' 7\")   Wt 72.1 kg (159 lb)   SpO2 99%   BMI 24.90 kg/m²       Wt Readings from Last 3 Encounters:   01/21/20 72.1 kg (159 lb)   10/30/19 68.9 kg (152 lb)   05/30/19 70.3 kg (155 lb)       Physical Exam   Constitutional: He is oriented to person, place, and time. He appears well-developed and well-nourished. No distress.   HENT:   Head: Normocephalic.   Neck: Normal range of motion.   Cardiovascular: Normal rate, regular rhythm and normal heart sounds.   Pulmonary/Chest: Breath sounds normal. No respiratory distress. He has no wheezes. He exhibits no tenderness.   Abdominal: Soft. He exhibits no distension. There is no tenderness.   Musculoskeletal: Normal range of motion.   Neurological: He is alert and oriented to person, place, and time.   Skin: Skin is warm and dry. He is not diaphoretic.   Psychiatric: He has a normal mood and affect.        LABS  Lab Results   Component Value Date    WBC 6.09 03/19/2015    HGB 14.8 03/19/2015    HCT 43.3 03/19/2015     03/19/2015    CHOL 130 03/19/2015    TRIG 82 03/19/2015    LDLCALC 74 03/19/2015    HDL 40 (L) 03/19/2015    ALT 29 03/19/2015    AST 27 03/19/2015     03/19/2015    K 5.0 03/19/2015     03/19/2015    CREATININE 1.0 03/19/2015    BUN 14 03/19/2015    CO2 28 03/19/2015    GLUCOSE 94 03/19/2015       IMAGING/PROCEDURES    Cardiac Imaging   TRANSTHORACIC ECHO (TTE) COMPLETE 05/30/2019    Narrative · Normal-sized left ventricular cavity. Normal left ventricular wall   thickness. Preserved " systolic function. Estimated EF = 65- 70%. Grade I   diastolic dysfunction.Normal septal wall motion. No regional wall motion   abnormalities.  · Normal aortic valve structure. Tricuspid aortic valve. Mild aortic valve   regurgitation. No significant aortic valve stenosis.  · Aortic root normal.  · Normal leaflet structure and normal leaflet motion of the mitral valve.   Trace mitral valve regurgitation.  · Normal-sized left atrium.  · Normal-sized right ventricular cavity with preserved systolic function.  · Tricuspid valve structure is normal. Trace tricuspid valve   regurgitation.  · Normal size IVC and collapses >50% during inspiration.  · No evidence of pericardial effusion.  · Compared to the previous echocardiogram from 2018 no significant   change.  · At the completion of the test I discussed the results and follow-up with   Mr. Auguste.          ECG [id number]      Problem List Items Addressed This Visit        Nervous    RESOLVED: Atypical chest pain       Circulatory    Hypertension, essential    Overview     Overview:          Current Assessment & Plan     Well controlled on exam today.  Continue current medical regimen.  Reviewed sodium restriction, weight loss, maintaining ideal BMI and regular exercise program as physiologic means to help achieve blood pressure control.          Relevant Orders    Hemoglobin A1c    Agatston coronary artery calcium score greater than 400    Overview      , 403 in LAD.  SE  unremarkable    2018 stress echo negative for ischemia however poor exercise tolerance         Current Assessment & Plan     Denies chest pain/angina or exertional symptoms.  Reviewed aggressive diet and weight loss with Mediterranean type diet.  Increased activity as well.  Most recent LDL 90.  Will start zetia to decrease LDL, check lipids, CMP before next visit.           Relevant Medications    ezetimibe (ZETIA) 10 mg tablet    Other Relevant Orders    Comprehensive  metabolic panel    CBC    Lipid panel    APC (atrial premature contractions)    Current Assessment & Plan     Asymptomatic, no intervention.         Relevant Medications    ezetimibe (ZETIA) 10 mg tablet    SVT (supraventricular tachycardia) (CMS/HCC) - Primary    Overview     Brief on stress test         Current Assessment & Plan     No signs or symptoms suggestive of recurrence.          Relevant Medications    ezetimibe (ZETIA) 10 mg tablet    Nonrheumatic aortic valve insufficiency    Overview     Mild 2016    Mild 2019         Current Assessment & Plan     Asymptomatic, this can be routinely checked via echo every couple years or so.          Relevant Medications    ezetimibe (ZETIA) 10 mg tablet       Endocrine/Metabolic    Hypercholesterolemia    Current Assessment & Plan     11/2019: HDL39, LDL 90, triglycerides 82    Reviewed aggressive diet changes with Mediterranean type diet.  Would ideally like his LDL lower, reviewed that zetia in addition to diet would likely get his LDL below 70 which would continue to decrease risk factors for MI.  He agrees.    Start zetia 10mg daily, recheck cmp, lipid panel prior to next visit.          Relevant Medications    ezetimibe (ZETIA) 10 mg tablet       Other    History of syncope    Overview     2/19 and once in 2018 ? etiology         Current Assessment & Plan     No further episodes, continue to monitor.          Abnormal EKG    Pre-operative cardiovascular examination    Current Assessment & Plan     Mr. Auguste will require bilateral cataract surgery on 1/28/20 & 2/12/2020 at eye surgery center of [facility].  From a cardiac standpoint both Mr. Auguste and the procedure itself are considered low risk for cardiac complications.      He has no documented hx of MI, CVA, DM, CHF, or kidney disease.  He does have CAD via coronary calcium scoring however this is not a contraindication to the procedure especially given he his lack of symptoms.  His EKG is also normal today  with no evidence of ischemia.  His functional capacity is excellent as he is able to walk over 1/2 mile without symptoms or breaks.  There for there are no contraindications to proceeding with the planned procedure without any additional cardiac testing.  Reviewed with patient that with any procedure regardless of health history of status there are potential risks for adverse cardiac complications.  He verbalizes understanding.             Relevant Orders    ECG 12 lead (Completed)          ILupe am scribing for, and in the presence of Carlos Manuel Peña DO.      This patient note has been dictated using speech recognition software. Inadvertent speech recognition errors should be disregarded. Please do not hesitate to call my office for clarifications.    STEFANIE Abel  1/21/2020 1:31 PM

## 2020-01-21 NOTE — ASSESSMENT & PLAN NOTE
11/2019: HDL39, LDL 90, triglycerides 82    Reviewed aggressive diet changes with Mediterranean type diet.  Would ideally like his LDL lower, reviewed that zetia in addition to diet would likely get his LDL below 70 which would continue to decrease risk factors for MI.  He agrees.    Start zetia 10mg daily, recheck cmp, lipid panel prior to next visit.

## 2020-01-21 NOTE — PROGRESS NOTES
CARDIOLOGY OUTPATIENT PROGRESS NOTE    Quentin Auguste Jr. is a 74 y.o. male  who presents with pre-operative cardiac exam and ongoing cardiac care.  He has a significant history of HTN, CAD, hypercholesterolemia, mild AI & syncope.  He has been feeling generally well with no complaints of chest pain/angina, shortness of breath, orthopnea, palpitations, dizziness or syncope.  He admits to no formal exercise lately and room for improvement within his diet.  He feels motivated to get more healthy as when he was exercising last year he felt his best.    Today on exam his blood pressure and HR are well controlled.  EKG demonstrates NSR with no evidence of ischemia.    Mr. Auguste will require bilateral cataract surgery on 1/28/20 & 2/12/2020 at eye surgery center of [facility].  From a cardiac standpoint both Mr. Auguste and the procedure itself are considered low risk for cardiac complications.  He has no documented hx of MI, CVA, DM, CHF, or kidney disease.  He does have CAD via coronary calcium scoring however this is not a contraindication to the procedure especially given he his lack of symptoms.  His EKG is also normal today with no evidence of ischemia.  His functional capacity is excellent as he is able to walk over 1/2 mile without symptoms or breaks.  There for there are no contraindications to proceeding with the planned procedure without any additional cardiac testing.  Reviewed with patient that with any procedure regardless of health history of status there are potential risks for adverse cardiac complications.  He verbalizes understanding.           The following have been reviewed and updated as appropriate in this visit:  Tobacco  Allergies  Meds  Problems  Med Hx  Surg Hx  Fam Hx  Soc Hx      Past Medical History:   Diagnosis Date   • APC (atrial premature contractions) 4/8/2018   • Atypical chest pain 10/10/2018   • Cataracts, bilateral    • Coronary artery disease involving native coronary  artery of native heart without angina pectoris 4/8/2018 2014 , 403 in LAD.  SE 9/16 unremarkable   • Hypercholesterolemia 4/8/2018   • Hypertension, essential 4/8/2018    Overview:    • Nonrheumatic aortic valve insufficiency 4/8/2018    Mild 2016  Overview:    • SVT (supraventricular tachycardia) (CMS/HCC) 4/8/2018    Brief on stress test   • Syncope and collapse 4/30/2019       :   Past Surgical History:   Procedure Laterality Date   • BACK SURGERY  2001   • HERNIA REPAIR     • ROTATOR CUFF REPAIR Left    • TONSILLECTOMY         ALLERGIES   Lisinopril    Current Outpatient Medications   Medication Sig Dispense Refill   • aspirin 81 mg chewable tablet 81 mg.     • cholecalciferol, vitamin D3, (VITAMIN D3) 1,000 unit capsule No SIG Entered     • metoprolol tartrate (LOPRESSOR) 25 mg tablet Take 1 tablet (25 mg total) by mouth 2 (two) times a day. 180 tablet 3   • olmesartan-hydrochlorothiazide (BENICAR HCT) 40-12.5 mg per tablet Take 1 tablet by mouth daily. 90 tablet 3   • omega 3-dha-epa-fish oil (FISH OIL) 1,000 mg (120 mg-180 mg) capsule No SIG Entered     • rosuvastatin (CRESTOR) 20 mg tablet Take 1 tablet (20 mg total) by mouth daily. 90 tablet 3   • ezetimibe (ZETIA) 10 mg tablet Take 1 tablet (10 mg total) by mouth nightly. 90 tablet 3     No current facility-administered medications for this visit.           Social History     Socioeconomic History   • Marital status:      Spouse name: None   • Number of children: None   • Years of education: None   • Highest education level: None   Occupational History   • None   Social Needs   • Financial resource strain: None   • Food insecurity:     Worry: None     Inability: None   • Transportation needs:     Medical: None     Non-medical: None   Tobacco Use   • Smoking status: Never Smoker   • Smokeless tobacco: Never Used   Substance and Sexual Activity   • Alcohol use: Yes     Comment: Wine, with meals   • Drug use: No   • Sexual activity: None  "  Lifestyle   • Physical activity:     Days per week: None     Minutes per session: None   • Stress: None   Relationships   • Social connections:     Talks on phone: None     Gets together: None     Attends Mosque service: None     Active member of club or organization: None     Attends meetings of clubs or organizations: None     Relationship status: None   • Intimate partner violence:     Fear of current or ex partner: None     Emotionally abused: None     Physically abused: None     Forced sexual activity: None   Other Topics Concern   • None   Social History Narrative   • None          Family History   Problem Relation Age of Onset   • Heart failure Biological Father        Review of Systems   Constitution: Negative for diaphoresis, fever and malaise/fatigue.   HENT: Negative for hoarse voice and nosebleeds.    Eyes: Negative for blurred vision and visual disturbance.   Cardiovascular: Negative for chest pain, cyanosis, dyspnea on exertion, irregular heartbeat, leg swelling, near-syncope, orthopnea, palpitations, paroxysmal nocturnal dyspnea and syncope.   Respiratory: Negative for hemoptysis, shortness of breath, sputum production and wheezing.    Endocrine: Negative for cold intolerance and heat intolerance.   Hematologic/Lymphatic: Negative for bleeding problem.   Skin: Negative for color change and nail changes.   Musculoskeletal: Negative for muscle weakness and myalgias.   Gastrointestinal: Negative for abdominal pain, hematemesis, hematochezia and jaundice.   Genitourinary: Negative for dysuria and hematuria.   Neurological: Negative for light-headedness, vertigo and weakness.   Psychiatric/Behavioral: Negative for altered mental status. The patient does not have insomnia and is not nervous/anxious.        Objective     Visit Vitals  /72 (BP Location: Left upper arm, Patient Position: Sitting)   Pulse 76   Ht 1.702 m (5' 7\")   Wt 72.1 kg (159 lb)   SpO2 99%   BMI 24.90 kg/m²       Wt Readings " from Last 3 Encounters:   01/21/20 72.1 kg (159 lb)   10/30/19 68.9 kg (152 lb)   05/30/19 70.3 kg (155 lb)       Physical Exam   Constitutional: He is oriented to person, place, and time. He appears well-developed and well-nourished. No distress.   HENT:   Head: Normocephalic.   Neck: Normal range of motion.   Cardiovascular: Normal rate, regular rhythm and normal heart sounds.   Pulmonary/Chest: Breath sounds normal. No respiratory distress. He has no wheezes. He exhibits no tenderness.   Abdominal: Soft. He exhibits no distension. There is no tenderness.   Musculoskeletal: Normal range of motion.   Neurological: He is alert and oriented to person, place, and time.   Skin: Skin is warm and dry. He is not diaphoretic.   Psychiatric: He has a normal mood and affect.        LABS  Lab Results   Component Value Date    WBC 6.09 03/19/2015    HGB 14.8 03/19/2015    HCT 43.3 03/19/2015     03/19/2015    CHOL 130 03/19/2015    TRIG 82 03/19/2015    LDLCALC 74 03/19/2015    HDL 40 (L) 03/19/2015    ALT 29 03/19/2015    AST 27 03/19/2015     03/19/2015    K 5.0 03/19/2015     03/19/2015    CREATININE 1.0 03/19/2015    BUN 14 03/19/2015    CO2 28 03/19/2015    GLUCOSE 94 03/19/2015       IMAGING/PROCEDURES    Cardiac Imaging   TRANSTHORACIC ECHO (TTE) COMPLETE 05/30/2019    Narrative · Normal-sized left ventricular cavity. Normal left ventricular wall   thickness. Preserved systolic function. Estimated EF = 65- 70%. Grade I   diastolic dysfunction.Normal septal wall motion. No regional wall motion   abnormalities.  · Normal aortic valve structure. Tricuspid aortic valve. Mild aortic valve   regurgitation. No significant aortic valve stenosis.  · Aortic root normal.  · Normal leaflet structure and normal leaflet motion of the mitral valve.   Trace mitral valve regurgitation.  · Normal-sized left atrium.  · Normal-sized right ventricular cavity with preserved systolic function.  · Tricuspid valve structure  is normal. Trace tricuspid valve   regurgitation.  · Normal size IVC and collapses >50% during inspiration.  · No evidence of pericardial effusion.  · Compared to the previous echocardiogram from 2018 no significant   change.  · At the completion of the test I discussed the results and follow-up with   Mr. Auguste.          ECG [id number]      Problem List Items Addressed This Visit        Nervous    RESOLVED: Atypical chest pain       Circulatory    Hypertension, essential    Overview     Overview:          Current Assessment & Plan     Well controlled on exam today.  Continue current medical regimen.  Reviewed sodium restriction, weight loss, maintaining ideal BMI and regular exercise program as physiologic means to help achieve blood pressure control.          Relevant Orders    Hemoglobin A1c    Agatston coronary artery calcium score greater than 400    Overview      , 403 in LAD.  SE  unremarkable     stress echo negative for ischemia however poor exercise tolerance         Current Assessment & Plan     Denies chest pain/angina or exertional symptoms.  Reviewed aggressive diet and weight loss with Mediterranean type diet.  Increased activity as well.  Most recent LDL 90.  Will start zetia to decrease LDL, check lipids, CMP before next visit.           Relevant Medications    ezetimibe (ZETIA) 10 mg tablet    Other Relevant Orders    Comprehensive metabolic panel    CBC    Lipid panel    APC (atrial premature contractions)    Current Assessment & Plan     Asymptomatic, no intervention.         Relevant Medications    ezetimibe (ZETIA) 10 mg tablet    SVT (supraventricular tachycardia) (CMS/HCC) - Primary    Overview     Brief on stress test         Current Assessment & Plan     No signs or symptoms suggestive of recurrence.          Relevant Medications    ezetimibe (ZETIA) 10 mg tablet    Nonrheumatic aortic valve insufficiency    Overview     Mild 2016    Mild 2019         Current  Assessment & Plan     Asymptomatic, this can be routinely checked via echo every couple years or so.          Relevant Medications    ezetimibe (ZETIA) 10 mg tablet       Endocrine/Metabolic    Hypercholesterolemia    Current Assessment & Plan     11/2019: HDL39, LDL 90, triglycerides 82    Reviewed aggressive diet changes with Mediterranean type diet.  Would ideally like his LDL lower, reviewed that zetia in addition to diet would likely get his LDL below 70 which would continue to decrease risk factors for MI.  He agrees.    Start zetia 10mg daily, recheck cmp, lipid panel prior to next visit.          Relevant Medications    ezetimibe (ZETIA) 10 mg tablet       Other    History of syncope    Overview     2/19 and once in 2018 ? etiology         Current Assessment & Plan     No further episodes, continue to monitor.          Abnormal EKG    Pre-operative cardiovascular examination    Current Assessment & Plan     Mr. Auguste will require bilateral cataract surgery on 1/28/20 & 2/12/2020 at eye surgery center of [facility].  From a cardiac standpoint both Mr. Auguste and the procedure itself are considered low risk for cardiac complications.      He has no documented hx of MI, CVA, DM, CHF, or kidney disease.  He does have CAD via coronary calcium scoring however this is not a contraindication to the procedure especially given he his lack of symptoms.  His EKG is also normal today with no evidence of ischemia.  His functional capacity is excellent as he is able to walk over 1/2 mile without symptoms or breaks.  There for there are no contraindications to proceeding with the planned procedure without any additional cardiac testing.  Reviewed with patient that with any procedure regardless of health history of status there are potential risks for adverse cardiac complications.  He verbalizes understanding.             Relevant Orders    ECG 12 lead (Completed)          Lupe PONCE, am scribing for, and in the  presence of Carlos Manuel Peña DO.  I, Carlos Manuel Peña DO, personally performed the services described in this documentation as scribed by Lupe Nation in my presence, and it is both accurate and complete.    This patient note has been dictated using speech recognition software. Inadvertent speech recognition errors should be disregarded. Please do not hesitate to call my office for clarifications.    STEFANIE Abel  1/21/2020 1:31 PM

## 2020-01-21 NOTE — ASSESSMENT & PLAN NOTE
Denies chest pain/angina or exertional symptoms.  Reviewed aggressive diet and weight loss with Mediterranean type diet.  Increased activity as well.  Most recent LDL 90.  Will start zetia to decrease LDL, check lipids, CMP before next visit.

## 2020-05-01 RX ORDER — ROSUVASTATIN CALCIUM 20 MG/1
20 TABLET, COATED ORAL DAILY
Qty: 90 TABLET | Refills: 3 | Status: SHIPPED | OUTPATIENT
Start: 2020-05-01 | End: 2021-03-01

## 2020-06-08 RX ORDER — METOPROLOL TARTRATE 25 MG/1
TABLET, FILM COATED ORAL
Qty: 180 TABLET | Refills: 3 | Status: SHIPPED | OUTPATIENT
Start: 2020-06-08 | End: 2021-05-28

## 2020-06-19 RX ORDER — OLMESARTAN MEDOXOMIL AND HYDROCHLOROTHIAZIDE 40/12.5 40; 12.5 MG/1; MG/1
TABLET ORAL
Qty: 90 TABLET | Refills: 6 | Status: SHIPPED | OUTPATIENT
Start: 2020-06-19 | End: 2021-09-14

## 2020-07-22 ENCOUNTER — OFFICE VISIT (OUTPATIENT)
Dept: CARDIOLOGY | Facility: CLINIC | Age: 75
End: 2020-07-22
Payer: COMMERCIAL

## 2020-07-22 VITALS
DIASTOLIC BLOOD PRESSURE: 60 MMHG | OXYGEN SATURATION: 98 % | SYSTOLIC BLOOD PRESSURE: 124 MMHG | WEIGHT: 152 LBS | HEART RATE: 75 BPM | BODY MASS INDEX: 23.81 KG/M2

## 2020-07-22 DIAGNOSIS — E78.00 HYPERCHOLESTEROLEMIA: ICD-10-CM

## 2020-07-22 DIAGNOSIS — Z87.898 HISTORY OF SYNCOPE: ICD-10-CM

## 2020-07-22 DIAGNOSIS — I10 HYPERTENSION, ESSENTIAL: ICD-10-CM

## 2020-07-22 DIAGNOSIS — I35.1 NONRHEUMATIC AORTIC VALVE INSUFFICIENCY: ICD-10-CM

## 2020-07-22 DIAGNOSIS — R93.1 AGATSTON CORONARY ARTERY CALCIUM SCORE GREATER THAN 400: Primary | ICD-10-CM

## 2020-07-22 DIAGNOSIS — I47.10 SVT (SUPRAVENTRICULAR TACHYCARDIA) (CMS/HCC): ICD-10-CM

## 2020-07-22 DIAGNOSIS — I49.1 APC (ATRIAL PREMATURE CONTRACTIONS): ICD-10-CM

## 2020-07-22 PROBLEM — Z01.810 PRE-OPERATIVE CARDIOVASCULAR EXAMINATION: Status: RESOLVED | Noted: 2020-01-21 | Resolved: 2020-07-22

## 2020-07-22 PROCEDURE — 99214 OFFICE O/P EST MOD 30 MIN: CPT | Performed by: INTERNAL MEDICINE

## 2020-07-22 ASSESSMENT — ENCOUNTER SYMPTOMS
FEVER: 0
MYALGIAS: 0
BLURRED VISION: 0
DYSURIA: 0
DIAPHORESIS: 0
WHEEZING: 0
HEMATOCHEZIA: 0
POLYDIPSIA: 0
ALTERED MENTAL STATUS: 0
SPUTUM PRODUCTION: 0
HALLUCINATIONS: 0
HEMATEMESIS: 0
HOARSE VOICE: 0
HEMOPTYSIS: 0
TREMORS: 0
ODYNOPHAGIA: 0
BRIEF PARALYSIS: 0
ABDOMINAL PAIN: 0
FOCAL WEAKNESS: 0
FALLS: 0

## 2020-07-22 NOTE — ASSESSMENT & PLAN NOTE
He remains asymptomatic on a good medical regimen with an LDL of 68 previously 90.  Reviewed guideline directed care and medications and no change

## 2020-07-22 NOTE — PROGRESS NOTES
Cardiology Outpatient  Progress note    Quentin Auguste Jr. is a 74 y.o. male  who presents for ongoing cardiac care.    Summary: Coronary calcium score in 2014 405 of which 403 was in the LAD.  2018 stress echo no ischemia.  Normal systolic function.  Brief SVT noted on a stress test.  Hypertension, hypercholesterolemia, APCs, previous syncope.    He feels well.  He is active without any angina, exertional dyspnea, orthopnea, palpitation, tachycardia, syncope or near syncope or focal neurologic symptoms.  No medication side effects.  He feels well.    Past medical, family, social history were reviewed and there has been no significant interval change.       Past Medical History:   Diagnosis Date   • APC (atrial premature contractions) 4/8/2018   • Atypical chest pain 10/10/2018   • Cataracts, bilateral    • Coronary artery disease involving native coronary artery of native heart without angina pectoris 4/8/2018 2014 , 403 in LAD.  SE 9/16 unremarkable   • Hypercholesterolemia 4/8/2018   • Hypertension, essential 4/8/2018    Overview:    • Nonrheumatic aortic valve insufficiency 4/8/2018    Mild 2016  Overview:    • SVT (supraventricular tachycardia) (CMS/HCC) 4/8/2018    Brief on stress test   • Syncope and collapse 4/30/2019       :   Past Surgical History:   Procedure Laterality Date   • BACK SURGERY  2001   • HERNIA REPAIR     • ROTATOR CUFF REPAIR Left    • TONSILLECTOMY         Allergies:   Lisinopril    Current Outpatient Medications   Medication Sig Dispense Refill   • aspirin 81 mg chewable tablet 81 mg.     • cholecalciferol, vitamin D3, (VITAMIN D3) 1,000 unit capsule No SIG Entered     • ezetimibe (ZETIA) 10 mg tablet Take 1 tablet (10 mg total) by mouth nightly. 90 tablet 3   • metoprolol tartrate (LOPRESSOR) 25 mg tablet TAKE 1 TABLET BY MOUTH TWICE A  tablet 3   • olmesartan-hydrochlorothiazide (BENICAR HCT) 40-12.5 mg per tablet TAKE 1 TABLET BY MOUTH EVERY DAY 90 tablet 6   •  omega 3-dha-epa-fish oil (FISH OIL) 1,000 mg (120 mg-180 mg) capsule No SIG Entered     • rosuvastatin (CRESTOR) 20 mg tablet Take 1 tablet (20 mg total) by mouth daily. 90 tablet 3     No current facility-administered medications for this visit.           Social History     Socioeconomic History   • Marital status:      Spouse name: None   • Number of children: None   • Years of education: None   • Highest education level: None   Occupational History   • None   Social Needs   • Financial resource strain: None   • Food insecurity:     Worry: None     Inability: None   • Transportation needs:     Medical: None     Non-medical: None   Tobacco Use   • Smoking status: Never Smoker   • Smokeless tobacco: Never Used   Substance and Sexual Activity   • Alcohol use: Yes     Comment: Wine, with meals   • Drug use: No   • Sexual activity: None   Lifestyle   • Physical activity:     Days per week: None     Minutes per session: None   • Stress: None   Relationships   • Social connections:     Talks on phone: None     Gets together: None     Attends Anabaptism service: None     Active member of club or organization: None     Attends meetings of clubs or organizations: None     Relationship status: None   • Intimate partner violence:     Fear of current or ex partner: None     Emotionally abused: None     Physically abused: None     Forced sexual activity: None   Other Topics Concern   • None   Social History Narrative   • None          Family History   Problem Relation Age of Onset   • Heart failure Biological Father        Review of Systems   Constitution: Negative for diaphoresis and fever.   HENT: Negative for hoarse voice and odynophagia.    Eyes: Negative for blurred vision and visual disturbance.   Respiratory: Negative for hemoptysis, sputum production and wheezing.    Endocrine: Negative for cold intolerance, heat intolerance and polydipsia.   Skin: Negative for rash.   Musculoskeletal: Negative for falls, muscle  weakness and myalgias.   Gastrointestinal: Negative for abdominal pain, hematemesis and hematochezia.   Genitourinary: Negative for dysuria.   Neurological: Negative for brief paralysis, focal weakness and tremors.   Psychiatric/Behavioral: Negative for altered mental status and hallucinations.       Objective     Visit Vitals  /60   Pulse 75   Wt 68.9 kg (152 lb)   SpO2 98%   BMI 23.81 kg/m²     Wt Readings from Last 3 Encounters:   07/22/20 68.9 kg (152 lb)   01/21/20 72.1 kg (159 lb)   10/30/19 68.9 kg (152 lb)         Physical Exam   Constitutional: He is oriented to person, place, and time. He appears well-developed.   HENT:   Head: Atraumatic.   Mouth/Throat: Oropharynx is clear and moist.   Eyes: Conjunctivae are normal. No scleral icterus.   Neck: No JVD present. Carotid bruit is not present. No tracheal deviation present. No thyromegaly present.   Cardiovascular: Normal rate, regular rhythm and normal heart sounds. Exam reveals no gallop and no friction rub.   No murmur heard.  Pulmonary/Chest: Effort normal and breath sounds normal. No respiratory distress. He has no wheezes. He has no rales.   Abdominal: Soft. Bowel sounds are normal. He exhibits no distension.   Musculoskeletal: He exhibits no edema or tenderness.   Neurological: He is alert and oriented to person, place, and time. No cranial nerve deficit.   Skin: Skin is warm and dry. He is not diaphoretic.   Psychiatric: He has a normal mood and affect.   Vitals reviewed.       Labs   Lab Results   Component Value Date    WBC 6.09 03/19/2015    HGB 14.8 03/19/2015    HCT 43.3 03/19/2015     03/19/2015    CHOL 130 03/19/2015    TRIG 82 03/19/2015    HDL 40 (L) 03/19/2015    LDLCALC 74 03/19/2015    ALT 29 03/19/2015    AST 27 03/19/2015     03/19/2015    K 5.0 03/19/2015     03/19/2015    CREATININE 1.0 03/19/2015    BUN 14 03/19/2015    CO2 28 03/19/2015    GLUCOSE 94 03/19/2015     Labs performed at [facility] and  scanned hemoglobin 15.2 with platelets of 173,000  Cholesterol 108, triglycerides 61, HDL 40 and LDL 68.  CMP unremarkable with a potassium of 4.2 her creatinine is 0.93 and normal liver enzymes.  Hemoglobin A1c of 4.9    Imaging    Cardiac Imaging   TRANSTHORACIC ECHO (TTE) COMPLETE 05/30/2019    Narrative · Normal-sized left ventricular cavity. Normal left ventricular wall   thickness. Preserved systolic function. Estimated EF = 65- 70%. Grade I   diastolic dysfunction.Normal septal wall motion. No regional wall motion   abnormalities.  · Normal aortic valve structure. Tricuspid aortic valve. Mild aortic valve   regurgitation. No significant aortic valve stenosis.  · Aortic root normal.  · Normal leaflet structure and normal leaflet motion of the mitral valve.   Trace mitral valve regurgitation.  · Normal-sized left atrium.  · Normal-sized right ventricular cavity with preserved systolic function.  · Tricuspid valve structure is normal. Trace tricuspid valve   regurgitation.  · Normal size IVC and collapses >50% during inspiration.  · No evidence of pericardial effusion.  · Compared to the previous echocardiogram from October 2018 no significant   change.  · At the completion of the test I discussed the results and follow-up with   Mr. Auguste.          Problem List Items Addressed This Visit        High    Agatston coronary artery calcium score greater than 400 - Primary    Overview     2014 , 403 in LAD.  SE 9/16 unremarkable    2018 stress echo negative for ischemia however poor exercise tolerance         Current Assessment & Plan     He remains asymptomatic on a good medical regimen with an LDL of 68 previously 90.  Reviewed guideline directed care and medications and no change         SVT (supraventricular tachycardia) (CMS/[facility])    Overview     Brief on stress test         Current Assessment & Plan     Asymptomatic.  No intervention.            Medium    Hypertension, essential    Overview     Overview:           Current Assessment & Plan     Controlled.  No change.         Hypercholesterolemia    Current Assessment & Plan     Improved post addition of ezetimibe with the LDL decreased from the previous 90 now 68.  No change.  No side effects.  Continue rosuvastatin 20 mg.         Nonrheumatic aortic valve insufficiency    Overview     Mild 2016    Mild 2019         Current Assessment & Plan     This will be reevaluated in a few years.         History of syncope    Overview     2/19 and once in 2018 ? etiology         Current Assessment & Plan     No recurrence.            Low    APC (atrial premature contractions)          This patient note has been dictated using speech recognition software. Inadvertent speech recognition errors should be disregarded. Please do not hesitate to call my office for clarifications.    Carlos Manuel Peña, DO  7/22/2020 6:18 PM

## 2020-07-22 NOTE — ASSESSMENT & PLAN NOTE
Improved post addition of ezetimibe with the LDL decreased from the previous 90 now 68.  No change.  No side effects.  Continue rosuvastatin 20 mg.

## 2020-12-07 RX ORDER — EZETIMIBE 10 MG/1
TABLET ORAL
Qty: 90 TABLET | Refills: 3 | Status: SHIPPED | OUTPATIENT
Start: 2020-12-07 | End: 2021-12-23

## 2021-01-22 ENCOUNTER — OFFICE VISIT (OUTPATIENT)
Dept: CARDIOLOGY | Facility: CLINIC | Age: 76
End: 2021-01-22
Payer: COMMERCIAL

## 2021-01-22 VITALS
BODY MASS INDEX: 24.8 KG/M2 | HEIGHT: 67 IN | DIASTOLIC BLOOD PRESSURE: 73 MMHG | WEIGHT: 158 LBS | HEART RATE: 73 BPM | SYSTOLIC BLOOD PRESSURE: 133 MMHG | OXYGEN SATURATION: 98 %

## 2021-01-22 DIAGNOSIS — Z87.898 HISTORY OF SYNCOPE: ICD-10-CM

## 2021-01-22 DIAGNOSIS — I10 HYPERTENSION, ESSENTIAL: ICD-10-CM

## 2021-01-22 DIAGNOSIS — E78.00 HYPERCHOLESTEROLEMIA: ICD-10-CM

## 2021-01-22 DIAGNOSIS — I47.10 SVT (SUPRAVENTRICULAR TACHYCARDIA) (CMS/HCC): ICD-10-CM

## 2021-01-22 DIAGNOSIS — I49.1 APC (ATRIAL PREMATURE CONTRACTIONS): ICD-10-CM

## 2021-01-22 DIAGNOSIS — R94.31 ABNORMAL EKG: ICD-10-CM

## 2021-01-22 DIAGNOSIS — I35.1 NONRHEUMATIC AORTIC VALVE INSUFFICIENCY: ICD-10-CM

## 2021-01-22 DIAGNOSIS — R93.1 AGATSTON CORONARY ARTERY CALCIUM SCORE GREATER THAN 400: Primary | ICD-10-CM

## 2021-01-22 PROCEDURE — 99214 OFFICE O/P EST MOD 30 MIN: CPT | Performed by: INTERNAL MEDICINE

## 2021-01-22 PROCEDURE — 93000 ELECTROCARDIOGRAM COMPLETE: CPT | Performed by: NURSE PRACTITIONER

## 2021-01-22 ASSESSMENT — ENCOUNTER SYMPTOMS
ORTHOPNEA: 0
ABDOMINAL PAIN: 0
HOARSE VOICE: 0
BLURRED VISION: 0
NAIL CHANGES: 0
MYALGIAS: 0
DYSURIA: 0
VERTIGO: 0
WEAKNESS: 0
PALPITATIONS: 0
COLOR CHANGE: 0
WHEEZING: 0
LIGHT-HEADEDNESS: 0
HEMATURIA: 0
SPUTUM PRODUCTION: 0
SYNCOPE: 0
HEMATOCHEZIA: 0
SHORTNESS OF BREATH: 0
DIAPHORESIS: 0
DYSPNEA ON EXERTION: 0
HEMATEMESIS: 0
HEMOPTYSIS: 0
PND: 0
INSOMNIA: 0
FEVER: 0
IRREGULAR HEARTBEAT: 0
NEAR-SYNCOPE: 0
JAUNDICE: 0
NERVOUS/ANXIOUS: 0
ALTERED MENTAL STATUS: 0

## 2021-01-22 NOTE — LETTER
January 22, 2021                                                                                                                                                                                                                                                                                                               DEREJE Chun DO  [address] [place] [address]    Patient: Quentin Auguste Jr.  YOB: 1945  Date of Visit: 1/22/2021    Dear Dr. Chun:    Thank you for referring Quentin Auguste Jr. to me for evaluation. Below are the relevant portions of my assessment and plan of care.    If you have questions, please do not hesitate to call me. I look forward to following Quentin along with you.         Sincerely,        Carlos Manuel Peña DO        CC: No Recipients        Assessment and Plan:  Problem List Items Addressed This Visit        High    Agatston coronary artery calcium score greater than 400 - Primary     Denies any CP or other exertional symptoms.  LDL well controlled.  Continue optimal guideline directed therapy and heart healthy lifestyle.          Relevant Orders    ECG 12 lead (Completed)    Comprehensive metabolic panel    CBC    Lipid panel    Echocardiogram stress test    SVT (supraventricular tachycardia) (CMS/HCC)     No signs or symptoms suggestive of recurrence, continue to monitor             Medium    Hypertension, essential     Well controlled continue current regimen         Hypercholesterolemia     Continue intensive lipid-lowering medical and lifestyle changes with LDL goal less than 70 mg/dL. Appropriate medical therapy discussed. Improve diet with Mediterranean type diet plan and exercise patterns to aid in management.     Check lipids, cmp prior to next visit         Nonrheumatic aortic valve insufficiency     Asymptomatic, will be evaluated via echo when her returns next visit         History of syncope     Fortunately no syncope or near syncope             Low    APC (atrial premature contractions)     Asymptomatic, no intervention         Abnormal EKG     EKG NSR, possible septal infarct which is unchanged.  For routine stress echo next visit

## 2021-01-22 NOTE — ASSESSMENT & PLAN NOTE
Denies any CP or other exertional symptoms.  LDL well controlled.  Continue optimal guideline directed therapy and heart healthy lifestyle.

## 2021-01-22 NOTE — PROGRESS NOTES
"CARDIOLOGY OUTPATIENT PROGRESS NOTE    Quentin Auguste Jr. is a 75 y.o. male  who presents with ongoing cardiac care.    Summary: Coronary calcium score in 2014 405 of which 403 was in the LAD.  2018 stress echo no ischemia.  Normal systolic function.  Brief SVT noted on a stress test.  Hypertension, hypercholesterolemia, APCs, previous syncope.    Interval hx: he has been feeling generally well with no complaints of chest pain/angina, shortness of breath, orthopnea, palpitations, dizziness or syncope.  He admits to \"falling of the wagon\" with exercise.  Usually walks however not as much with the pandemic.  He does try to maintain a healthy diet.    Today on exam his blood pressure and HR well controlled.  EKG NSR with no ischemic changes noted.  Most recent lab work reviewed with relevant values outlined below.       The following have been reviewed and updated as appropriate in this visit:  Tobacco  Allergies  Meds  Problems  Med Hx  Surg Hx  Fam Hx  Soc Hx      Past Medical History:   Diagnosis Date   • APC (atrial premature contractions) 4/8/2018   • Atypical chest pain 10/10/2018   • Cataracts, bilateral    • Coronary artery disease involving native coronary artery of native heart without angina pectoris 4/8/2018 2014 , 403 in LAD.  SE 9/16 unremarkable   • Hypercholesterolemia 4/8/2018   • Hypertension, essential 4/8/2018    Overview:    • Nonrheumatic aortic valve insufficiency 4/8/2018    Mild 2016  Overview:    • SVT (supraventricular tachycardia) (CMS/HCC) 4/8/2018    Brief on stress test   • Syncope and collapse 4/30/2019       :   Past Surgical History:   Procedure Laterality Date   • BACK SURGERY  2001   • HERNIA REPAIR     • ROTATOR CUFF REPAIR Left    • TONSILLECTOMY         ALLERGIES   Lisinopril    Current Outpatient Medications   Medication Sig Dispense Refill   • aspirin 81 mg chewable tablet 81 mg.     • cholecalciferol, vitamin D3, (VITAMIN D3) 1,000 unit capsule No SIG " Entered     • ezetimibe (ZETIA) 10 mg tablet TAKE 1 TABLET BY MOUTH EVERY DAY AT NIGHT 90 tablet 3   • metoprolol tartrate (LOPRESSOR) 25 mg tablet TAKE 1 TABLET BY MOUTH TWICE A  tablet 3   • olmesartan-hydrochlorothiazide (BENICAR HCT) 40-12.5 mg per tablet TAKE 1 TABLET BY MOUTH EVERY DAY 90 tablet 6   • omega 3-dha-epa-fish oil (FISH OIL) 1,000 mg (120 mg-180 mg) capsule No SIG Entered     • rosuvastatin (CRESTOR) 20 mg tablet Take 1 tablet (20 mg total) by mouth daily. 90 tablet 3     No current facility-administered medications for this visit.           Social History     Socioeconomic History   • Marital status:      Spouse name: None   • Number of children: None   • Years of education: None   • Highest education level: None   Occupational History   • None   Social Needs   • Financial resource strain: None   • Food insecurity     Worry: None     Inability: None   • Transportation needs     Medical: None     Non-medical: None   Tobacco Use   • Smoking status: Never Smoker   • Smokeless tobacco: Never Used   Substance and Sexual Activity   • Alcohol use: Yes     Comment: Wine, with meals   • Drug use: No   • Sexual activity: None   Lifestyle   • Physical activity     Days per week: None     Minutes per session: None   • Stress: None   Relationships   • Social connections     Talks on phone: None     Gets together: None     Attends Yazdanism service: None     Active member of club or organization: None     Attends meetings of clubs or organizations: None     Relationship status: None   • Intimate partner violence     Fear of current or ex partner: None     Emotionally abused: None     Physically abused: None     Forced sexual activity: None   Other Topics Concern   • None   Social History Narrative   • None          Family History   Problem Relation Age of Onset   • Heart failure Biological Father        Review of Systems   Constitution: Negative for diaphoresis, fever and malaise/fatigue.   HENT:  "Negative for hoarse voice and nosebleeds.    Eyes: Negative for blurred vision and visual disturbance.   Cardiovascular: Negative for chest pain, cyanosis, dyspnea on exertion, irregular heartbeat, leg swelling, near-syncope, orthopnea, palpitations, paroxysmal nocturnal dyspnea and syncope.   Respiratory: Negative for hemoptysis, shortness of breath, sputum production and wheezing.    Endocrine: Negative for cold intolerance and heat intolerance.   Hematologic/Lymphatic: Negative for bleeding problem.   Skin: Negative for color change and nail changes.   Musculoskeletal: Negative for muscle weakness and myalgias.   Gastrointestinal: Negative for abdominal pain, hematemesis, hematochezia and jaundice.   Genitourinary: Negative for dysuria and hematuria.   Neurological: Negative for light-headedness, vertigo and weakness.   Psychiatric/Behavioral: Negative for altered mental status. The patient does not have insomnia and is not nervous/anxious.        Objective     Visit Vitals  /73 (BP Location: Left upper arm, Patient Position: Sitting)   Pulse 73   Ht 1.702 m (5' 7\")   Wt 71.7 kg (158 lb)   SpO2 98%   BMI 24.75 kg/m²       Wt Readings from Last 3 Encounters:   01/22/21 71.7 kg (158 lb)   07/22/20 68.9 kg (152 lb)   01/21/20 72.1 kg (159 lb)       Physical Exam   Constitutional: He is oriented to person, place, and time. He appears well-developed and well-nourished. No distress.   HENT:   Head: Normocephalic.   Neck: Normal range of motion.   Cardiovascular: Normal rate, regular rhythm and normal heart sounds.   Pulmonary/Chest: Breath sounds normal. No respiratory distress. He has no wheezes. He exhibits no tenderness.   Abdominal: Soft. He exhibits no distension. There is no abdominal tenderness.   Musculoskeletal: Normal range of motion.   Neurological: He is alert and oriented to person, place, and time.   Skin: Skin is warm and dry. He is not diaphoretic.   Psychiatric: He has a normal mood and affect. "        LABS  Lab Results   Component Value Date    WBC 6.09 2015    HGB 14.8 2015    HCT 43.3 2015     2015    CHOL 130 2015    TRIG 82 2015    LDLCALC 74 2015    HDL 40 (L) 2015    ALT 29 2015    AST 27 2015     2015    K 5.0 2015     2015    CREATININE 1.0 2015    BUN 14 2015    CO2 28 2015    GLUCOSE 94 2015     IMAGING/PROCEDURES    Cardiac Imaging   TRANSTHORACIC ECHO (TTE) COMPLETE 2019    Narrative · Normal-sized left ventricular cavity. Normal left ventricular wall   thickness. Preserved systolic function. Estimated EF = 65- 70%. Grade I   diastolic dysfunction.Normal septal wall motion. No regional wall motion   abnormalities.  · Normal aortic valve structure. Tricuspid aortic valve. Mild aortic valve   regurgitation. No significant aortic valve stenosis.  · Aortic root normal.  · Normal leaflet structure and normal leaflet motion of the mitral valve.   Trace mitral valve regurgitation.  · Normal-sized left atrium.  · Normal-sized right ventricular cavity with preserved systolic function.  · Tricuspid valve structure is normal. Trace tricuspid valve   regurgitation.  · Normal size IVC and collapses >50% during inspiration.  · No evidence of pericardial effusion.  · Compared to the previous echocardiogram from 2018 no significant   change.  · At the completion of the test I discussed the results and follow-up with   Mr. Auguste.          ECG EK21      Problem List Items Addressed This Visit        Circulatory    Hypertension, essential    Overview     Overview:          Current Assessment & Plan     Well controlled continue current regimen         Agatston coronary artery calcium score greater than 400 - Primary    Overview      , 403 in LAD.  SE  unremarkable    2018 stress echo negative for ischemia however poor exercise tolerance         Current  Assessment & Plan     Denies any CP or other exertional symptoms.  LDL well controlled.  Continue optimal guideline directed therapy and heart healthy lifestyle.          Relevant Orders    ECG 12 lead (Completed)    Comprehensive metabolic panel    CBC    Lipid panel    Echocardiogram stress test    APC (atrial premature contractions)    Current Assessment & Plan     Asymptomatic, no intervention         SVT (supraventricular tachycardia) (CMS/HCC)    Overview     Brief on stress test         Current Assessment & Plan     No signs or symptoms suggestive of recurrence, continue to monitor          Nonrheumatic aortic valve insufficiency    Overview     Mild 2016    Mild 2019         Current Assessment & Plan     Asymptomatic, will be evaluated via echo when her returns next visit            Endocrine/Metabolic    Hypercholesterolemia    Overview     2020 LDL 55         Current Assessment & Plan     Continue intensive lipid-lowering medical and lifestyle changes with LDL goal less than 70 mg/dL. Appropriate medical therapy discussed. Improve diet with Mediterranean type diet plan and exercise patterns to aid in management.     Check lipids, cmp prior to next visit            Other    History of syncope    Overview     2/19 and once in 2018 ? etiology         Current Assessment & Plan     Fortunately no syncope or near syncope         Abnormal EKG    Overview     Possible septal infarct         Current Assessment & Plan     EKG NSR, possible septal infarct which is unchanged.  For routine stress echo next visit               ILupe, am scribing for, and in the presence of Carlos Manuel Peña DO.  I, Carlos Manuel Peña DO, personally performed the services described in this documentation as scribed by Lupe Nation in my presence, and it is both accurate and complete.    This patient note has been dictated using speech recognition software. Inadvertent speech recognition errors should be disregarded. Please do not  hesitate to call my office for clarifications.    STEFANIE Abel  1/22/2021 10:21 AM

## 2021-01-22 NOTE — ASSESSMENT & PLAN NOTE
Continue intensive lipid-lowering medical and lifestyle changes with LDL goal less than 70 mg/dL. Appropriate medical therapy discussed. Improve diet with Mediterranean type diet plan and exercise patterns to aid in management.     Check lipids, cmp prior to next visit

## 2021-01-22 NOTE — LETTER
"January 22, 2021     DEREJE Chun DO  [address] [facility] [address]    Patient: Quentin Auguste Jr.  YOB: 1945  Date of Visit: 1/22/2021      Dear Dr. Chun:    Thank you for referring Quentin Auguste Jr. to me for evaluation. Below are my notes for this consultation.    If you have questions, please do not hesitate to call me. I look forward to following your patient along with you.         Sincerely,        Carlos Manuel Peña DO        CC: No Recipients  Lupe Nation CRNP  1/22/2021 11:38 AM  Sign when Signing Visit  CARDIOLOGY OUTPATIENT PROGRESS NOTE    Quentin Auguste Jr. is a 75 y.o. male  who presents with ongoing cardiac care.    Summary: Coronary calcium score in 2014 405 of which 403 was in the LAD.  2018 stress echo no ischemia.  Normal systolic function.  Brief SVT noted on a stress test.  Hypertension, hypercholesterolemia, APCs, previous syncope.    Interval hx: he has been feeling generally well with no complaints of chest pain/angina, shortness of breath, orthopnea, palpitations, dizziness or syncope.  He admits to \"falling of the wagon\" with exercise.  Usually walks however not as much with the pandemic.  He does try to maintain a healthy diet.    Today on exam his blood pressure and HR well controlled.  EKG NSR with no ischemic changes noted.  Most recent lab work reviewed with relevant values outlined below.       The following have been reviewed and updated as appropriate in this visit:  Tobacco  Allergies  Meds  Problems  Med Hx  Surg Hx  Fam Hx  Soc Hx      Past Medical History:   Diagnosis Date   • APC (atrial premature contractions) 4/8/2018   • Atypical chest pain 10/10/2018   • Cataracts, bilateral    • Coronary artery disease involving native coronary artery of native heart without angina pectoris 4/8/2018 2014 , 403 in LAD.  SE 9/16 unremarkable   • Hypercholesterolemia 4/8/2018   • Hypertension, essential 4/8/2018    Overview:    • " Nonrheumatic aortic valve insufficiency 4/8/2018    Mild 2016  Overview:    • SVT (supraventricular tachycardia) (CMS/HCC) 4/8/2018    Brief on stress test   • Syncope and collapse 4/30/2019       :   Past Surgical History:   Procedure Laterality Date   • BACK SURGERY  2001   • HERNIA REPAIR     • ROTATOR CUFF REPAIR Left    • TONSILLECTOMY         ALLERGIES   Lisinopril    Current Outpatient Medications   Medication Sig Dispense Refill   • aspirin 81 mg chewable tablet 81 mg.     • cholecalciferol, vitamin D3, (VITAMIN D3) 1,000 unit capsule No SIG Entered     • ezetimibe (ZETIA) 10 mg tablet TAKE 1 TABLET BY MOUTH EVERY DAY AT NIGHT 90 tablet 3   • metoprolol tartrate (LOPRESSOR) 25 mg tablet TAKE 1 TABLET BY MOUTH TWICE A  tablet 3   • olmesartan-hydrochlorothiazide (BENICAR HCT) 40-12.5 mg per tablet TAKE 1 TABLET BY MOUTH EVERY DAY 90 tablet 6   • omega 3-dha-epa-fish oil (FISH OIL) 1,000 mg (120 mg-180 mg) capsule No SIG Entered     • rosuvastatin (CRESTOR) 20 mg tablet Take 1 tablet (20 mg total) by mouth daily. 90 tablet 3     No current facility-administered medications for this visit.           Social History     Socioeconomic History   • Marital status:      Spouse name: None   • Number of children: None   • Years of education: None   • Highest education level: None   Occupational History   • None   Social Needs   • Financial resource strain: None   • Food insecurity     Worry: None     Inability: None   • Transportation needs     Medical: None     Non-medical: None   Tobacco Use   • Smoking status: Never Smoker   • Smokeless tobacco: Never Used   Substance and Sexual Activity   • Alcohol use: Yes     Comment: Wine, with meals   • Drug use: No   • Sexual activity: None   Lifestyle   • Physical activity     Days per week: None     Minutes per session: None   • Stress: None   Relationships   • Social connections     Talks on phone: None     Gets together: None     Attends [employer] service:  "None     Active member of club or organization: None     Attends meetings of clubs or organizations: None     Relationship status: None   • Intimate partner violence     Fear of current or ex partner: None     Emotionally abused: None     Physically abused: None     Forced sexual activity: None   Other Topics Concern   • None   Social History Narrative   • None          Family History   Problem Relation Age of Onset   • Heart failure Biological Father        Review of Systems   Constitution: Negative for diaphoresis, fever and malaise/fatigue.   HENT: Negative for hoarse voice and nosebleeds.    Eyes: Negative for blurred vision and visual disturbance.   Cardiovascular: Negative for chest pain, cyanosis, dyspnea on exertion, irregular heartbeat, leg swelling, near-syncope, orthopnea, palpitations, paroxysmal nocturnal dyspnea and syncope.   Respiratory: Negative for hemoptysis, shortness of breath, sputum production and wheezing.    Endocrine: Negative for cold intolerance and heat intolerance.   Hematologic/Lymphatic: Negative for bleeding problem.   Skin: Negative for color change and nail changes.   Musculoskeletal: Negative for muscle weakness and myalgias.   Gastrointestinal: Negative for abdominal pain, hematemesis, hematochezia and jaundice.   Genitourinary: Negative for dysuria and hematuria.   Neurological: Negative for light-headedness, vertigo and weakness.   Psychiatric/Behavioral: Negative for altered mental status. The patient does not have insomnia and is not nervous/anxious.        Objective     Visit Vitals  /73 (BP Location: Left upper arm, Patient Position: Sitting)   Pulse 73   Ht 1.702 m (5' 7\")   Wt 71.7 kg (158 lb)   SpO2 98%   BMI 24.75 kg/m²       Wt Readings from Last 3 Encounters:   01/22/21 71.7 kg (158 lb)   07/22/20 68.9 kg (152 lb)   01/21/20 72.1 kg (159 lb)       Physical Exam   Constitutional: He is oriented to person, place, and time. He appears well-developed and " well-nourished. No distress.   HENT:   Head: Normocephalic.   Neck: Normal range of motion.   Cardiovascular: Normal rate, regular rhythm and normal heart sounds.   Pulmonary/Chest: Breath sounds normal. No respiratory distress. He has no wheezes. He exhibits no tenderness.   Abdominal: Soft. He exhibits no distension. There is no abdominal tenderness.   Musculoskeletal: Normal range of motion.   Neurological: He is alert and oriented to person, place, and time.   Skin: Skin is warm and dry. He is not diaphoretic.   Psychiatric: He has a normal mood and affect.        LABS  Lab Results   Component Value Date    WBC 6.09 03/19/2015    HGB 14.8 03/19/2015    HCT 43.3 03/19/2015     03/19/2015    CHOL 130 03/19/2015    TRIG 82 03/19/2015    LDLCALC 74 03/19/2015    HDL 40 (L) 03/19/2015    ALT 29 03/19/2015    AST 27 03/19/2015     03/19/2015    K 5.0 03/19/2015     03/19/2015    CREATININE 1.0 03/19/2015    BUN 14 03/19/2015    CO2 28 03/19/2015    GLUCOSE 94 03/19/2015     IMAGING/PROCEDURES    Cardiac Imaging   TRANSTHORACIC ECHO (TTE) COMPLETE 05/30/2019    Narrative · Normal-sized left ventricular cavity. Normal left ventricular wall   thickness. Preserved systolic function. Estimated EF = 65- 70%. Grade I   diastolic dysfunction.Normal septal wall motion. No regional wall motion   abnormalities.  · Normal aortic valve structure. Tricuspid aortic valve. Mild aortic valve   regurgitation. No significant aortic valve stenosis.  · Aortic root normal.  · Normal leaflet structure and normal leaflet motion of the mitral valve.   Trace mitral valve regurgitation.  · Normal-sized left atrium.  · Normal-sized right ventricular cavity with preserved systolic function.  · Tricuspid valve structure is normal. Trace tricuspid valve   regurgitation.  · Normal size IVC and collapses >50% during inspiration.  · No evidence of pericardial effusion.  · Compared to the previous echocardiogram from October 2018 no  significant   change.  · At the completion of the test I discussed the results and follow-up with   Mr. Auguste.          ECG EK21      Problem List Items Addressed This Visit        Circulatory    Hypertension, essential    Overview     Overview:          Current Assessment & Plan     Well controlled continue current regimen         Agatston coronary artery calcium score greater than 400 - Primary    Overview      , 403 in LAD.  SE  unremarkable     stress echo negative for ischemia however poor exercise tolerance         Current Assessment & Plan     Denies any CP or other exertional symptoms.  LDL well controlled.  Continue optimal guideline directed therapy and heart healthy lifestyle.          Relevant Orders    ECG 12 lead (Completed)    Comprehensive metabolic panel    CBC    Lipid panel    Echocardiogram stress test    APC (atrial premature contractions)    Current Assessment & Plan     Asymptomatic, no intervention         SVT (supraventricular tachycardia) (CMS/HCC)    Overview     Brief on stress test         Current Assessment & Plan     No signs or symptoms suggestive of recurrence, continue to monitor          Nonrheumatic aortic valve insufficiency    Overview     Mild     Mild 2019         Current Assessment & Plan     Asymptomatic, will be evaluated via echo when her returns next visit            Endocrine/Metabolic    Hypercholesterolemia    Overview      LDL 55         Current Assessment & Plan     Continue intensive lipid-lowering medical and lifestyle changes with LDL goal less than 70 mg/dL. Appropriate medical therapy discussed. Improve diet with Mediterranean type diet plan and exercise patterns to aid in management.     Check lipids, cmp prior to next visit            Other    History of syncope    Overview      and once in 2018 ? etiology         Current Assessment & Plan     Fortunately no syncope or near syncope         Abnormal EKG    Overview      Possible septal infarct         Current Assessment & Plan     EKG NSR, possible septal infarct which is unchanged.  For routine stress echo next visit               I, Lupe Nation, am scribing for, and in the presence of Carlos Manuel Peña DO.      This patient note has been dictated using speech recognition software. Inadvertent speech recognition errors should be disregarded. Please do not hesitate to call my office for clarifications.    STEFANIE Abel  1/22/2021 10:21 AM

## 2021-03-01 RX ORDER — ROSUVASTATIN CALCIUM 20 MG/1
TABLET, COATED ORAL
Qty: 90 TABLET | Refills: 3 | Status: SHIPPED | OUTPATIENT
Start: 2021-03-01 | End: 2022-04-11

## 2021-04-15 DIAGNOSIS — Z23 ENCOUNTER FOR IMMUNIZATION: ICD-10-CM

## 2021-05-28 RX ORDER — METOPROLOL TARTRATE 25 MG/1
TABLET, FILM COATED ORAL
Qty: 180 TABLET | Refills: 3 | Status: SHIPPED | OUTPATIENT
Start: 2021-05-28 | End: 2022-05-20

## 2021-07-12 ENCOUNTER — TELEPHONE (OUTPATIENT)
Dept: CARDIOLOGY | Facility: CLINIC | Age: 76
End: 2021-07-12

## 2021-07-12 NOTE — TELEPHONE ENCOUNTER
Patient needs auth for a stress echo 7/22/2021 [facility] Juan J, WINIFRED [id number].  Thank you!

## 2021-08-24 ENCOUNTER — OFFICE VISIT (OUTPATIENT)
Dept: CARDIOLOGY | Facility: CLINIC | Age: 76
End: 2021-08-24
Payer: COMMERCIAL

## 2021-08-24 ENCOUNTER — HOSPITAL ENCOUNTER (OUTPATIENT)
Dept: CARDIOLOGY | Facility: CLINIC | Age: 76
Discharge: HOME | End: 2021-08-24
Attending: NURSE PRACTITIONER
Payer: COMMERCIAL

## 2021-08-24 VITALS
HEART RATE: 84 BPM | WEIGHT: 152 LBS | OXYGEN SATURATION: 97 % | BODY MASS INDEX: 23.81 KG/M2 | DIASTOLIC BLOOD PRESSURE: 62 MMHG | SYSTOLIC BLOOD PRESSURE: 131 MMHG

## 2021-08-24 VITALS
WEIGHT: 158 LBS | HEIGHT: 67 IN | SYSTOLIC BLOOD PRESSURE: 133 MMHG | BODY MASS INDEX: 24.8 KG/M2 | DIASTOLIC BLOOD PRESSURE: 73 MMHG

## 2021-08-24 DIAGNOSIS — R93.1 AGATSTON CORONARY ARTERY CALCIUM SCORE GREATER THAN 400: ICD-10-CM

## 2021-08-24 DIAGNOSIS — E78.00 HYPERCHOLESTEROLEMIA: ICD-10-CM

## 2021-08-24 DIAGNOSIS — I35.1 NONRHEUMATIC AORTIC VALVE INSUFFICIENCY: ICD-10-CM

## 2021-08-24 DIAGNOSIS — I47.10 SVT (SUPRAVENTRICULAR TACHYCARDIA) (CMS/HCC): ICD-10-CM

## 2021-08-24 DIAGNOSIS — Z87.898 HISTORY OF SYNCOPE: ICD-10-CM

## 2021-08-24 DIAGNOSIS — I10 HYPERTENSION, ESSENTIAL: ICD-10-CM

## 2021-08-24 DIAGNOSIS — R93.1 AGATSTON CORONARY ARTERY CALCIUM SCORE GREATER THAN 400: Primary | ICD-10-CM

## 2021-08-24 LAB
AORTIC ROOT ANNULUS: 3.3 CM
ASCENDING AORTA: 3.2 CM
AV PEAK GRADIENT: 5 MMHG
AV PEAK VELOCITY-S: 1.14 M/S
AV PEAK VELOCITY-S: 1.14 M/S
AV REG PEAK VEL: 3.16 M/S
AV REGURGITATION PRESSURE HALF TIME: 512 MS
AV VALVE AREA: 3.07 CM2
BSA FOR ECHO PROCEDURE: 1.84 M2
E WAVE DECELERATION TIME: 289 MS
E/A RATIO: 0.9
E/E' RATIO: 9
E/LAT E' RATIO: 9.2
EDV (BP): 63.5 CM3
EF (A4C): 67 %
EF A2C: 63.7 %
EJECTION FRACTION: 66.1 %
ESV (BP): 21.5 CM3
LA ESV (BP): 36.5 CM3
LA ESV INDEX (A2C): 16.36 CM3/M2
LA ESV INDEX (BP): 19.84 CM3/M2
LAAS-AP2: 12.6 CM2
LAAS-AP4: 15.5 CM2
LALD A4C: 4.29 CM
LALD A4C: 4.87 CM
LAV-S: 30.1 CM3
LEFT ATRIUM VOLUME INDEX: 19.84 CM3/M2
LEFT ATRIUM VOLUME: 36.5 CM3
LEFT VENTRICLE DIASTOLIC VOLUME INDEX: 31.14 CM3/M2
LEFT VENTRICLE DIASTOLIC VOLUME: 57.3 CM3
LEFT VENTRICLE SYSTOLIC VOLUME INDEX: 10.27 CM3/M2
LEFT VENTRICLE SYSTOLIC VOLUME: 18.9 CM3
LV DIASTOLIC VOLUME: 63 CM3
LV ESV (APICAL 2 CHAMBER): 22.9 CM3
LVAD-AP2: 23 CM2
LVAD-AP4: 20.7 CM2
LVAS-AP2: 12.5 CM2
LVAS-AP4: 11 CM2
LVEDVI(A2C): 34.24 CM3/M2
LVEDVI(BP): 34.51 CM3/M2
LVESVI(A2C): 12.45 CM3/M2
LVESVI(BP): 11.68 CM3/M2
LVLD-AP2: 6.9 CM
LVLD-AP4: 6.16 CM
LVLS-AP2: 5.69 CM
LVLS-AP4: 5.31 CM
LVOT 2D: 2.1 CM
LVOT A: 3.46 CM2
LVOT PEAK VELOCITY: 1.01 M/S
LVOT PG: 4 MMHG
MV E'TISSUE VEL-LAT: 0.08 M/S
MV E'TISSUE VEL-MED: 0.08 M/S
MV PEAK A VEL: 0.83 M/S
MV PEAK E VEL: 0.72 M/S
MV VALVE AREA P 1/2 METHOD: 2.59 CM2
PV PEAK GRADIENT: 4 MMHG
PV PV: 1.02 M/S
RVOT VMAX: 0.88 M/S
STRESS ANGINA INDEX: 0
STRESS BASELINE BP: NORMAL MMHG
STRESS BASELINE HR: 80 BPM
STRESS ECHO POST RECOVERY HR: 112 BPM
STRESS O2 SAT REST: 98 %
STRESS PERCENT HR: 97 %
STRESS POST ESTIMATED WORKLOAD: 7 METS
STRESS POST EXERCISE DUR MIN: 5 MIN
STRESS POST EXERCISE DUR SEC: 14 SEC
STRESS POST O2 SAT PEAK: 97 %
STRESS POST PEAK BP: NORMAL MMHG
STRESS POST PEAK HR: 139 BPM
STRESS TARGET HR: 122 BPM
TR MAX PG: 28 MMHG
TRICUSPID VALVE PEAK REGURGITATION VELOCITY: 2.65 M/S
TV REST PULMONARY ARTERY PRESSURE: 33 MMHG

## 2021-08-24 PROCEDURE — 99214 OFFICE O/P EST MOD 30 MIN: CPT | Mod: 25 | Performed by: INTERNAL MEDICINE

## 2021-08-24 PROCEDURE — 3008F BODY MASS INDEX DOCD: CPT | Performed by: INTERNAL MEDICINE

## 2021-08-24 PROCEDURE — 93351 STRESS TTE COMPLETE: CPT | Performed by: INTERNAL MEDICINE

## 2021-08-24 RX ORDER — TADALAFIL 5 MG/1
5 TABLET ORAL
COMMUNITY
Start: 2021-06-10

## 2021-08-24 ASSESSMENT — ENCOUNTER SYMPTOMS
WHEEZING: 0
FALLS: 0
FOCAL WEAKNESS: 0
BLURRED VISION: 0
FEVER: 0
TREMORS: 0
DIAPHORESIS: 0
HOARSE VOICE: 0
HEMATEMESIS: 0
BRIEF PARALYSIS: 0
HALLUCINATIONS: 0
SPUTUM PRODUCTION: 0
ALTERED MENTAL STATUS: 0
ODYNOPHAGIA: 0
HEMOPTYSIS: 0
ABDOMINAL PAIN: 0
HEMATOCHEZIA: 0
DYSURIA: 0
POLYDIPSIA: 0
MYALGIAS: 0

## 2021-08-24 NOTE — LETTER
August 24, 2021                                                                                                                                                                                                                                                                                                               DEREJE Chun DO  [address] [facility] [address]    Patient: Quentin Auguste Jr.  YOB: 1945  Date of Visit: 8/24/2021    Dear Dr. Chun:    Thank you for referring Quentin Auguste Jr. to me for evaluation. Below are the relevant portions of my assessment and plan of care.    If you have questions, please do not hesitate to call me. I look forward to following Quentin along with you.         Sincerely,        Carlos Manuel Peña DO        CC: No Recipients        Assessment and Plan:  Problem List Items Addressed This Visit        High    Agatston coronary artery calcium score greater than 400 - Primary     He remains asymptomatic.  His stress echo was negative for ischemia today.  Based on his lack of exercise or physical activity etc. his exercise capacity was reduced.  Reviewed guideline directed dietary change to continue medications.         SVT (supraventricular tachycardia) (CMS/HCC)     This is been asymptomatic and no intervention is needed at present.            Medium    Hypertension, essential     Well-controlled 131/62.  No change.         Hypercholesterolemia     Most last LDL was 55.  He will have repeat labs.  Diet reviewed.         Nonrheumatic aortic valve insufficiency     This is unlikely to become a clinically significant issue and no intervention is needed.         History of syncope     Nothing suggest arrhythmias or syncope.  No intervention needed.

## 2021-08-24 NOTE — ASSESSMENT & PLAN NOTE
He remains asymptomatic.  His stress echo was negative for ischemia today.  Based on his lack of exercise or physical activity etc. his exercise capacity was reduced.  Reviewed guideline directed dietary change to continue medications.

## 2021-08-24 NOTE — PROGRESS NOTES
Cardiology Outpatient  Progress note    Quentin Auguste Jr. is a 76 y.o. male  who presents for ongoing cardiovascular management and a stress echo.    Summary: Coronary calcium score in 2014 405 of which 403 was in the LAD.  2018 stress echo no ischemia.  Normal systolic function.  Brief SVT noted on a stress test.  Hypertension, hypercholesterolemia, APCs, previous syncope.    He feels well.  He continues to play golf regularly but rides in a cart.  No regular exercise.  He denies any angina, exertional dyspnea, thumping, palpitation with tachycardia, syncope, near syncope, claudication, focal neurologic symptoms or medication side effects.    Past medical, family, social history were reviewed and there has been no significant interval change.       Past Medical History:   Diagnosis Date   • APC (atrial premature contractions) 4/8/2018   • Atypical chest pain 10/10/2018   • Cataracts, bilateral    • Coronary artery disease involving native coronary artery of native heart without angina pectoris 4/8/2018 2014 , 403 in LAD.  SE 9/16 unremarkable   • Hypercholesterolemia 4/8/2018   • Hypertension, essential 4/8/2018    Overview:    • Nonrheumatic aortic valve insufficiency 4/8/2018    Mild 2016  Overview:    • SVT (supraventricular tachycardia) (CMS/HCC) 4/8/2018    Brief on stress test   • Syncope and collapse 4/30/2019       :   Past Surgical History:   Procedure Laterality Date   • BACK SURGERY  2001   • HERNIA REPAIR     • ROTATOR CUFF REPAIR Left    • TONSILLECTOMY         Allergies:   Lisinopril    Current Outpatient Medications   Medication Sig Dispense Refill   • aspirin 81 mg chewable tablet 81 mg.     • cholecalciferol, vitamin D3, (VITAMIN D3) 1,000 unit capsule No SIG Entered     • ezetimibe (ZETIA) 10 mg tablet TAKE 1 TABLET BY MOUTH EVERY DAY AT NIGHT 90 tablet 3   • metoprolol tartrate (LOPRESSOR) 25 mg tablet TAKE 1 TABLET BY MOUTH TWICE A  tablet 3   •  olmesartan-hydrochlorothiazide (BENICAR HCT) 40-12.5 mg per tablet TAKE 1 TABLET BY MOUTH EVERY DAY 90 tablet 6   • omega 3-dha-epa-fish oil (FISH OIL) 1,000 mg (120 mg-180 mg) capsule No SIG Entered     • rosuvastatin (CRESTOR) 20 mg tablet TAKE 1 TABLET BY MOUTH EVERY DAY 90 tablet 3   • tadalafiL (CIALIS) 5 mg tablet Take 5 mg by mouth once daily.       No current facility-administered medications for this visit.          Social History     Socioeconomic History   • Marital status:      Spouse name: None   • Number of children: None   • Years of education: None   • Highest education level: None   Occupational History   • None   Tobacco Use   • Smoking status: Never Smoker   • Smokeless tobacco: Never Used   Substance and Sexual Activity   • Alcohol use: Yes     Comment: Wine, with meals   • Drug use: No   • Sexual activity: None   Other Topics Concern   • None   Social History Narrative   • None     Social Determinants of Health     Financial Resource Strain:    • Difficulty of Paying Living Expenses:    Food Insecurity:    • Worried About Running Out of Food in the Last Year:    • Ran Out of Food in the Last Year:    Transportation Needs:    • Lack of Transportation (Medical):    • Lack of Transportation (Non-Medical):    Physical Activity:    • Days of Exercise per Week:    • Minutes of Exercise per Session:    Stress:    • Feeling of Stress :    Social Connections:    • Frequency of Communication with Friends and Family:    • Frequency of Social Gatherings with Friends and Family:    • Attends S[employer] Services:    • Active Member of Clubs or Organizations:    • Attends Club or Organization Meetings:    • Marital Status:    Intimate Partner Violence:    • Fear of Current or Ex-Partner:    • Emotionally Abused:    • Physically Abused:    • Sexually Abused:           Family History   Problem Relation Age of Onset   • Heart failure Biological Father        Review of Systems   Constitutional: Negative for  diaphoresis and fever.   HENT: Negative for hoarse voice and odynophagia.    Eyes: Negative for blurred vision and visual disturbance.   Respiratory: Negative for hemoptysis, sputum production and wheezing.    Endocrine: Negative for cold intolerance, heat intolerance and polydipsia.   Skin: Negative for rash.   Musculoskeletal: Negative for falls, muscle weakness and myalgias.   Gastrointestinal: Negative for abdominal pain, hematemesis and hematochezia.   Genitourinary: Negative for dysuria.   Neurological: Negative for brief paralysis, focal weakness and tremors.   Psychiatric/Behavioral: Negative for altered mental status and hallucinations.       Objective     Visit Vitals  /62 (BP Location: Right upper arm, Patient Position: Sitting)   Pulse 84   Wt 68.9 kg (152 lb)   SpO2 97%   BMI 23.81 kg/m²     Wt Readings from Last 3 Encounters:   08/24/21 68.9 kg (152 lb)   08/24/21 71.7 kg (158 lb)   01/22/21 71.7 kg (158 lb)         Physical Exam  Vitals reviewed.   Constitutional:       Appearance: He is well-developed. He is not diaphoretic.   HENT:      Head: Atraumatic.   Eyes:      General: No scleral icterus.     Conjunctiva/sclera: Conjunctivae normal.   Neck:      Thyroid: No thyromegaly.      Vascular: No carotid bruit or JVD.      Trachea: No tracheal deviation.   Cardiovascular:      Rate and Rhythm: Normal rate and regular rhythm.      Heart sounds: Normal heart sounds. No murmur heard.   No friction rub. No gallop.    Pulmonary:      Effort: Pulmonary effort is normal. No respiratory distress.      Breath sounds: Normal breath sounds. No wheezing or rales.   Abdominal:      General: Bowel sounds are normal. There is no distension.      Palpations: Abdomen is soft.   Musculoskeletal:         General: No tenderness.   Skin:     General: Skin is warm and dry.   Neurological:      Mental Status: He is alert and oriented to person, place, and time.      Cranial Nerves: No cranial nerve deficit.           Labs   Lab Results   Component Value Date    WBC 6.09 03/19/2015    HGB 14.8 03/19/2015    HCT 43.3 03/19/2015     03/19/2015    CHOL 130 03/19/2015    TRIG 82 03/19/2015    HDL 40 (L) 03/19/2015    LDLCALC 74 03/19/2015    ALT 29 03/19/2015    AST 27 03/19/2015     03/19/2015    K 5.0 03/19/2015     03/19/2015    CREATININE 1.0 03/19/2015    BUN 14 03/19/2015    CO2 28 03/19/2015    GLUCOSE 94 03/19/2015       Cardiac Imaging    ECHOCARDIOGRAM STRESS TEST 08/24/2021    Interpretation Summary  · Below average exercise capacity completing 5 minutes and 14 seconds of Aj exercise protocol, 7 METs workload, without angina.  · Stress ECG does not meet criteria for ischemia. Post-stress echocardiogram does not meet criteria for ischemia. All ventricular walls become hyperdynamic and the ejection fraction improves.  · Normal-sized LV. Normal LV systolic function. Estimated EF 65- 70%. Normal LV septal wall motion. No regional wall motion abnormalities. Normal LV wall thickness. Normal diastolic filling pattern for age of the LV.      Problem List Items Addressed This Visit        High    Agatston coronary artery calcium score greater than 400 - Primary    Overview     2014 , 403 in LAD.  SE 9/16 unremarkable    8/21 stress echo negative for ischemia however reduced exercise tolerance         Current Assessment & Plan     He remains asymptomatic.  His stress echo was negative for ischemia today.  Based on his lack of exercise or physical activity etc. his exercise capacity was reduced.  Reviewed guideline directed dietary change to continue medications.         SVT (supraventricular tachycardia) (CMS/[facility])    Overview     Brief on stress test         Current Assessment & Plan     This is been asymptomatic and no intervention is needed at present.            Medium    Hypertension, essential    Overview     Overview:          Current Assessment & Plan     Well-controlled 131/62.  No change.          Hypercholesterolemia    Overview     2020 LDL 55         Current Assessment & Plan     Most last LDL was 55.  He will have repeat labs.  Diet reviewed.         Nonrheumatic aortic valve insufficiency    Overview     Mild 2016, 2019, 2021             Current Assessment & Plan     This is unlikely to become a clinically significant issue and no intervention is needed.         History of syncope    Overview     2/19 and once in 2018 ? etiology         Current Assessment & Plan     Nothing suggest arrhythmias or syncope.  No intervention needed.               This patient note has been dictated using speech recognition software. Inadvertent speech recognition errors should be disregarded. Please do not hesitate to call my office for clarifications.    Carlos Manuel Peña,   8/24/2021 4:44 PM

## 2021-09-14 RX ORDER — OLMESARTAN MEDOXOMIL AND HYDROCHLOROTHIAZIDE 40/12.5 40; 12.5 MG/1; MG/1
TABLET ORAL
Qty: 90 TABLET | Refills: 6 | Status: SHIPPED | OUTPATIENT
Start: 2021-09-14 | End: 2022-09-21

## 2021-12-23 RX ORDER — EZETIMIBE 10 MG/1
TABLET ORAL
Qty: 90 TABLET | Refills: 3 | Status: SHIPPED | OUTPATIENT
Start: 2021-12-23 | End: 2022-12-15

## 2022-02-23 ASSESSMENT — ENCOUNTER SYMPTOMS
FEVER: 0
HEMOPTYSIS: 0
TREMORS: 0
MYALGIAS: 0
BRIEF PARALYSIS: 0
WHEEZING: 0
ODYNOPHAGIA: 0
SPUTUM PRODUCTION: 0
FALLS: 0
ABDOMINAL PAIN: 0
DYSURIA: 0
BLURRED VISION: 0
ALTERED MENTAL STATUS: 0
HEMATOCHEZIA: 0
HEMATEMESIS: 0
POLYDIPSIA: 0
HALLUCINATIONS: 0
FOCAL WEAKNESS: 0
DIAPHORESIS: 0
HOARSE VOICE: 0

## 2022-02-24 ENCOUNTER — OFFICE VISIT (OUTPATIENT)
Dept: CARDIOLOGY | Facility: CLINIC | Age: 77
End: 2022-02-24
Payer: COMMERCIAL

## 2022-02-24 VITALS
WEIGHT: 156 LBS | SYSTOLIC BLOOD PRESSURE: 148 MMHG | OXYGEN SATURATION: 98 % | DIASTOLIC BLOOD PRESSURE: 64 MMHG | HEART RATE: 69 BPM | BODY MASS INDEX: 24.43 KG/M2

## 2022-02-24 DIAGNOSIS — I10 HYPERTENSION, ESSENTIAL: ICD-10-CM

## 2022-02-24 DIAGNOSIS — E78.00 HYPERCHOLESTEROLEMIA: ICD-10-CM

## 2022-02-24 DIAGNOSIS — Z87.898 HISTORY OF SYNCOPE: ICD-10-CM

## 2022-02-24 DIAGNOSIS — R93.1 AGATSTON CORONARY ARTERY CALCIUM SCORE GREATER THAN 400: Primary | ICD-10-CM

## 2022-02-24 DIAGNOSIS — I47.10 SVT (SUPRAVENTRICULAR TACHYCARDIA) (CMS/HCC): ICD-10-CM

## 2022-02-24 DIAGNOSIS — I35.1 NONRHEUMATIC AORTIC VALVE INSUFFICIENCY: ICD-10-CM

## 2022-02-24 DIAGNOSIS — I49.1 APC (ATRIAL PREMATURE CONTRACTIONS): ICD-10-CM

## 2022-02-24 DIAGNOSIS — R94.31 ABNORMAL EKG: ICD-10-CM

## 2022-02-24 PROCEDURE — 99214 OFFICE O/P EST MOD 30 MIN: CPT | Performed by: INTERNAL MEDICINE

## 2022-02-24 PROCEDURE — 93000 ELECTROCARDIOGRAM COMPLETE: CPT | Performed by: INTERNAL MEDICINE

## 2022-02-24 PROCEDURE — 3008F BODY MASS INDEX DOCD: CPT | Performed by: INTERNAL MEDICINE

## 2022-02-24 NOTE — ASSESSMENT & PLAN NOTE
He has a high burden of atherosclerosis but remains asymptomatic without inducible ischemia on a stress echo.  Reviewed potential symptoms and prompt evaluation should they develop as well as continue guideline directed medical and lifestyle management.

## 2022-04-11 RX ORDER — ROSUVASTATIN CALCIUM 20 MG/1
TABLET, COATED ORAL
Qty: 90 TABLET | Refills: 3 | Status: SHIPPED | OUTPATIENT
Start: 2022-04-11 | End: 2023-04-03

## 2022-04-20 ENCOUNTER — TRANSCRIBE ORDERS (OUTPATIENT)
Dept: SCHEDULING | Age: 77
End: 2022-04-20

## 2022-04-20 ENCOUNTER — APPOINTMENT (OUTPATIENT)
Dept: LAB | Age: 77
End: 2022-04-20
Attending: UROLOGY
Payer: COMMERCIAL

## 2022-04-20 DIAGNOSIS — R30.0 DYSURIA: ICD-10-CM

## 2022-04-20 DIAGNOSIS — R30.0 DYSURIA: Primary | ICD-10-CM

## 2022-04-20 PROCEDURE — 87086 URINE CULTURE/COLONY COUNT: CPT

## 2022-04-22 LAB — BACTERIA UR CULT: NORMAL

## 2022-05-20 RX ORDER — METOPROLOL TARTRATE 25 MG/1
TABLET, FILM COATED ORAL
Qty: 180 TABLET | Refills: 3 | Status: SHIPPED | OUTPATIENT
Start: 2022-05-20 | End: 2023-05-18

## 2022-06-28 ENCOUNTER — TRANSCRIBE ORDERS (OUTPATIENT)
Dept: SCHEDULING | Age: 77
End: 2022-06-28

## 2022-06-28 DIAGNOSIS — S92.909A UNSPECIFIED FRACTURE OF UNSPECIFIED FOOT, INITIAL ENCOUNTER FOR CLOSED FRACTURE: Primary | ICD-10-CM

## 2022-06-30 ENCOUNTER — HOSPITAL ENCOUNTER (OUTPATIENT)
Dept: RADIOLOGY | Age: 77
Discharge: HOME | End: 2022-06-30
Attending: PODIATRIST
Payer: COMMERCIAL

## 2022-06-30 DIAGNOSIS — S92.909A UNSPECIFIED FRACTURE OF UNSPECIFIED FOOT, INITIAL ENCOUNTER FOR CLOSED FRACTURE: ICD-10-CM

## 2022-07-18 ENCOUNTER — TELEPHONE (OUTPATIENT)
Dept: SCHEDULING | Facility: CLINIC | Age: 77
End: 2022-07-18
Payer: COMMERCIAL

## 2022-07-19 ENCOUNTER — TELEPHONE (OUTPATIENT)
Dept: CARDIOLOGY | Facility: CLINIC | Age: 77
End: 2022-07-19
Payer: COMMERCIAL

## 2022-07-19 NOTE — TELEPHONE ENCOUNTER
Patient requested an appointment this week to see Dr. Peña.  LVM for Patient that he had a scheduled appt 8/25/22, but held an appt 7/28 if he needed to be seen sooner.  I followed up and called again, and pt stated he wants to be seen this week as he has a Biopsy 7/26/22 and needs an EKG.  I advised that we could do the EKG this week, however he would like to combine his 6 mth fu with the EKG.          Please advise

## 2022-07-19 NOTE — TELEPHONE ENCOUNTER
LVM for patient he as a Scheduled Appt 8/25/22.  Put a hold Jul 28th if he needs to be seen sooner than scheduled appt

## 2022-07-19 NOTE — TELEPHONE ENCOUNTER
Advised patient that we could not schedule him this week for his appointment; however we can set up his EKG for surgery.  Advised pt to have surgeon fax us order for ekg and we would call him to set up this week

## 2022-07-20 ENCOUNTER — TRANSCRIBE ORDERS (OUTPATIENT)
Dept: SCHEDULING | Age: 77
End: 2022-07-20

## 2022-07-20 ENCOUNTER — TELEPHONE (OUTPATIENT)
Dept: SCHEDULING | Facility: CLINIC | Age: 77
End: 2022-07-20
Payer: COMMERCIAL

## 2022-07-20 DIAGNOSIS — Z01.818 ENCOUNTER FOR OTHER PREPROCEDURAL EXAMINATION: Primary | ICD-10-CM

## 2022-07-20 NOTE — TELEPHONE ENCOUNTER
Pt calling to schedule an appt & request an EKG.    Pt is scheduled for 7/21 @ 3:15pm.    Pt can be reached at [phone] ([phone])

## 2022-07-21 ENCOUNTER — APPOINTMENT (OUTPATIENT)
Dept: LAB | Age: 77
End: 2022-07-21
Attending: INTERNAL MEDICINE
Payer: COMMERCIAL

## 2022-07-21 ENCOUNTER — APPOINTMENT (OUTPATIENT)
Dept: CARDIOLOGY | Facility: CLINIC | Age: 77
End: 2022-07-21
Payer: COMMERCIAL

## 2022-07-21 ENCOUNTER — TRANSCRIBE ORDERS (OUTPATIENT)
Dept: SCHEDULING | Age: 77
End: 2022-07-21

## 2022-07-21 DIAGNOSIS — Z01.818 ENCOUNTER FOR OTHER PREPROCEDURAL EXAMINATION: ICD-10-CM

## 2022-07-21 DIAGNOSIS — E78.00 HYPERCHOLESTEROLEMIA: ICD-10-CM

## 2022-07-21 DIAGNOSIS — I10 HYPERTENSION, ESSENTIAL: ICD-10-CM

## 2022-07-21 DIAGNOSIS — Z01.818 ENCOUNTER FOR OTHER PREPROCEDURAL EXAMINATION: Primary | ICD-10-CM

## 2022-07-21 DIAGNOSIS — I35.1 NONRHEUMATIC AORTIC VALVE INSUFFICIENCY: ICD-10-CM

## 2022-07-21 LAB
ALBUMIN SERPL-MCNC: 3.5 G/DL (ref 3.4–5)
ALP SERPL-CCNC: 60 IU/L (ref 35–126)
ALT SERPL-CCNC: 17 IU/L (ref 16–63)
ANION GAP SERPL CALC-SCNC: 8 MEQ/L (ref 3–15)
AST SERPL-CCNC: 20 IU/L (ref 15–41)
BASOPHILS # BLD: 0.03 K/UL (ref 0.01–0.1)
BASOPHILS NFR BLD: 0.5 %
BILIRUB SERPL-MCNC: 1.1 MG/DL (ref 0.3–1.2)
BUN SERPL-MCNC: 16 MG/DL (ref 8–20)
CALCIUM SERPL-MCNC: 9.1 MG/DL (ref 8.9–10.3)
CHLORIDE SERPL-SCNC: 109 MEQ/L (ref 98–109)
CHOLEST SERPL-MCNC: 107 MG/DL
CO2 SERPL-SCNC: 22 MEQ/L (ref 22–32)
CREAT SERPL-MCNC: 1 MG/DL (ref 0.8–1.3)
DIFFERENTIAL METHOD BLD: ABNORMAL
EOSINOPHIL # BLD: 0.2 K/UL (ref 0.04–0.54)
EOSINOPHIL NFR BLD: 3.3 %
ERYTHROCYTE [DISTWIDTH] IN BLOOD BY AUTOMATED COUNT: 13.9 % (ref 11.6–14.4)
GFR SERPL CREATININE-BSD FRML MDRD: >60 ML/MIN/1.73M*2
GLUCOSE SERPL-MCNC: 93 MG/DL (ref 70–99)
HCT VFR BLDCO AUTO: 42.6 % (ref 40.1–51)
HDLC SERPL-MCNC: 37 MG/DL
HDLC SERPL: 2.9 {RATIO}
HGB BLD-MCNC: 14.3 G/DL (ref 13.7–17.5)
IMM GRANULOCYTES # BLD AUTO: 0.03 K/UL (ref 0–0.08)
IMM GRANULOCYTES NFR BLD AUTO: 0.5 %
LDLC SERPL CALC-MCNC: 54 MG/DL
LYMPHOCYTES # BLD: 1.29 K/UL (ref 1.2–3.5)
LYMPHOCYTES NFR BLD: 21 %
MCH RBC QN AUTO: 30.1 PG (ref 28–33.2)
MCHC RBC AUTO-ENTMCNC: 33.6 G/DL (ref 32.2–36.5)
MCV RBC AUTO: 89.7 FL (ref 83–98)
MONOCYTES # BLD: 0.48 K/UL (ref 0.3–1)
MONOCYTES NFR BLD: 7.8 %
NEUTROPHILS # BLD: 4.11 K/UL (ref 1.7–7)
NEUTS SEG NFR BLD: 66.9 %
NONHDLC SERPL-MCNC: 70 MG/DL
NRBC BLD-RTO: 0 %
PDW BLD AUTO: 9.3 FL (ref 9.4–12.4)
PLATELET # BLD AUTO: 163 K/UL (ref 150–350)
POTASSIUM SERPL-SCNC: 4.1 MEQ/L (ref 3.6–5.1)
PROT SERPL-MCNC: 6 G/DL (ref 6–8.2)
PSA SERPL-MCNC: 4.13 NG/ML
RBC # BLD AUTO: 4.75 M/UL (ref 4.5–5.8)
SODIUM SERPL-SCNC: 139 MEQ/L (ref 136–144)
TRIGL SERPL-MCNC: 82 MG/DL (ref 30–149)
WBC # BLD AUTO: 6.14 K/UL (ref 3.8–10.5)

## 2022-07-21 PROCEDURE — 85025 COMPLETE CBC W/AUTO DIFF WBC: CPT

## 2022-07-21 PROCEDURE — 80053 COMPREHEN METABOLIC PANEL: CPT

## 2022-07-21 PROCEDURE — 36415 COLL VENOUS BLD VENIPUNCTURE: CPT

## 2022-07-21 PROCEDURE — 84153 ASSAY OF PSA TOTAL: CPT

## 2022-07-21 PROCEDURE — 80061 LIPID PANEL: CPT

## 2022-08-18 ENCOUNTER — OFFICE VISIT (OUTPATIENT)
Dept: CARDIOLOGY | Facility: CLINIC | Age: 77
End: 2022-08-18
Payer: COMMERCIAL

## 2022-08-18 VITALS
HEIGHT: 66 IN | DIASTOLIC BLOOD PRESSURE: 80 MMHG | WEIGHT: 148.2 LBS | RESPIRATION RATE: 16 BRPM | HEART RATE: 75 BPM | SYSTOLIC BLOOD PRESSURE: 138 MMHG | OXYGEN SATURATION: 97 % | BODY MASS INDEX: 23.82 KG/M2

## 2022-08-18 DIAGNOSIS — I47.10 SVT (SUPRAVENTRICULAR TACHYCARDIA) (CMS/HCC): ICD-10-CM

## 2022-08-18 DIAGNOSIS — I10 HYPERTENSION, ESSENTIAL: ICD-10-CM

## 2022-08-18 DIAGNOSIS — Z87.898 HISTORY OF SYNCOPE: ICD-10-CM

## 2022-08-18 DIAGNOSIS — I49.1 APC (ATRIAL PREMATURE CONTRACTIONS): ICD-10-CM

## 2022-08-18 DIAGNOSIS — R94.31 ABNORMAL EKG: ICD-10-CM

## 2022-08-18 DIAGNOSIS — R93.1 AGATSTON CORONARY ARTERY CALCIUM SCORE GREATER THAN 400: Primary | ICD-10-CM

## 2022-08-18 DIAGNOSIS — I35.1 NONRHEUMATIC AORTIC VALVE INSUFFICIENCY: ICD-10-CM

## 2022-08-18 DIAGNOSIS — E78.00 HYPERCHOLESTEROLEMIA: ICD-10-CM

## 2022-08-18 PROCEDURE — 3008F BODY MASS INDEX DOCD: CPT | Performed by: INTERNAL MEDICINE

## 2022-08-18 PROCEDURE — 99214 OFFICE O/P EST MOD 30 MIN: CPT | Performed by: INTERNAL MEDICINE

## 2022-08-18 ASSESSMENT — ENCOUNTER SYMPTOMS
FALLS: 0
HEMATEMESIS: 0
BRIEF PARALYSIS: 0
SPUTUM PRODUCTION: 0
ALTERED MENTAL STATUS: 0
POLYDIPSIA: 0
TREMORS: 0
ABDOMINAL PAIN: 0
FEVER: 0
FOCAL WEAKNESS: 0
DIAPHORESIS: 0
MYALGIAS: 0
HEMOPTYSIS: 0
WHEEZING: 0
ODYNOPHAGIA: 0
HOARSE VOICE: 0
HEMATOCHEZIA: 0
BLURRED VISION: 0
HALLUCINATIONS: 0
DYSURIA: 0

## 2022-08-18 NOTE — PROGRESS NOTES
Cardiology Outpatient  Progress note    Quentin Auguste Jr. is a 76 y.o. male  who presents for ongoing cardiovascular management and a stress echo.    Summary: Coronary calcium score in 2014 was 405 of which 403 was in the LAD.  2018 stress echo no ischemia.  Normal systolic function.  Brief SVT noted on a stress test.  Hypertension, hypercholesterolemia, APCs, previous syncope.    Denies angina, exertional dyspnea, orthopnea, palpitation, syncope, near syncope, claudication, focal neurologic symptoms, or medication side effects.  His right foot is presently a podiatric boot and should come out shortly.  However his play golf with this boot in place.  Unable to list any cardiovascular symptoms.    He does have bladder cancer which is being treated with BCG bladder infusion    Past medical, family, social history were reviewed and there has been no significant interval change.       Past Medical History:   Diagnosis Date   • APC (atrial premature contractions) 4/8/2018   • Atypical chest pain 10/10/2018   • Cancer (CMS/HCC)     Bladder cancer treated with BCG   • Cataracts, bilateral    • Coronary artery disease involving native coronary artery of native heart without angina pectoris 4/8/2018 2014 , 403 in LAD.  SE 9/16 unremarkable   • Hypercholesterolemia 4/8/2018   • Hypertension, essential 4/8/2018    Overview:    • Nonrheumatic aortic valve insufficiency 4/8/2018    Mild 2016  Overview:    • SVT (supraventricular tachycardia) (CMS/HCC) 4/8/2018    Brief on stress test   • Syncope and collapse 4/30/2019       :   Past Surgical History:   Procedure Laterality Date   • BACK SURGERY  2001   • HERNIA REPAIR     • ROTATOR CUFF REPAIR Left    • TONSILLECTOMY         Allergies:   Lisinopril    Current Outpatient Medications   Medication Sig Dispense Refill   • aspirin 81 mg chewable tablet 81 mg.     • cholecalciferol, vitamin D3, 25 mcg (1,000 unit) capsule No SIG Entered     • ezetimibe 10 mg tablet TAKE  "1 TABLET BY MOUTH EVERY DAY AT NIGHT 90 tablet 3   • metoprolol tartrate (LOPRESSOR) 25 mg tablet TAKE 1 TABLET BY MOUTH TWICE A  tablet 3   • olmesartan-hydrochlorothiazide (BENICAR HCT) 40-12.5 mg per tablet TAKE 1 TABLET BY MOUTH EVERY DAY 90 tablet 6   • omega 3-dha-epa-fish oil 1,000 mg (120 mg-180 mg) capsule No SIG Entered     • rosuvastatin (CRESTOR) 20 mg tablet TAKE 1 TABLET BY MOUTH EVERY DAY 90 tablet 3   • tadalafiL (CIALIS) 5 mg tablet Take 5 mg by mouth once daily.     • zinc 50 mg tablet        No current facility-administered medications for this visit.          Social History     Socioeconomic History   • Marital status:      Spouse name: None   • Number of children: None   • Years of education: None   • Highest education level: None   Tobacco Use   • Smoking status: Never Smoker   • Smokeless tobacco: Never Used   Substance and Sexual Activity   • Alcohol use: Yes     Comment: Wine, with meals   • Drug use: No          Family History   Problem Relation Age of Onset   • Heart failure Biological Father        Review of Systems   Constitutional: Negative for diaphoresis and fever.   HENT: Negative for hoarse voice and odynophagia.    Eyes: Negative for blurred vision and visual disturbance.   Respiratory: Negative for hemoptysis, sputum production and wheezing.    Endocrine: Negative for cold intolerance, heat intolerance and polydipsia.   Skin: Negative for rash.   Musculoskeletal: Negative for falls, muscle weakness and myalgias.   Gastrointestinal: Negative for abdominal pain, hematemesis and hematochezia.   Genitourinary: Negative for dysuria.   Neurological: Negative for brief paralysis, focal weakness and tremors.   Psychiatric/Behavioral: Negative for altered mental status and hallucinations.       Objective     Visit Vitals  /80 (BP Location: Right upper arm, Patient Position: Sitting)   Pulse 75   Resp 16   Ht 1.676 m (5' 6\")   Wt 67.2 kg (148 lb 3.2 oz)   SpO2 97%   BMI " 23.92 kg/m²     Wt Readings from Last 3 Encounters:   08/18/22 67.2 kg (148 lb 3.2 oz)   02/24/22 70.8 kg (156 lb)   08/24/21 71.7 kg (158 lb)         Physical Exam  Vitals reviewed.   Constitutional:       Appearance: He is well-developed. He is not diaphoretic.   HENT:      Head: Atraumatic.   Eyes:      General: No scleral icterus.     Conjunctiva/sclera: Conjunctivae normal.   Neck:      Thyroid: No thyromegaly.      Vascular: No carotid bruit or JVD.      Trachea: No tracheal deviation.   Cardiovascular:      Rate and Rhythm: Normal rate and regular rhythm.      Heart sounds: Normal heart sounds. No murmur heard.    No friction rub. No gallop.   Pulmonary:      Effort: Pulmonary effort is normal. No respiratory distress.      Breath sounds: Normal breath sounds. No wheezing or rales.   Abdominal:      General: Bowel sounds are normal. There is no distension.      Palpations: Abdomen is soft.   Musculoskeletal:         General: No tenderness.   Skin:     General: Skin is warm and dry.   Neurological:      Mental Status: He is alert and oriented to person, place, and time.      Cranial Nerves: No cranial nerve deficit.          Labs   Lab Results   Component Value Date    WBC 6.14 07/21/2022    HGB 14.3 07/21/2022    HCT 42.6 07/21/2022     07/21/2022    CHOL 107 07/21/2022    TRIG 82 07/21/2022    HDL 37 (L) 07/21/2022    LDLCALC 54 07/21/2022    ALT 17 07/21/2022    AST 20 07/21/2022     07/21/2022    K 4.1 07/21/2022     07/21/2022    CREATININE 1.0 07/21/2022    BUN 16 07/21/2022    CO2 22 07/21/2022    GLUCOSE 93 07/21/2022       Cardiac Imaging    ECHOCARDIOGRAM STRESS TEST 08/24/2021    Interpretation Summary  · Below average exercise capacity completing 5 minutes and 14 seconds of Aj exercise protocol, 7 METs workload, without angina.  · Stress ECG does not meet criteria for ischemia. Post-stress echocardiogram does not meet criteria for ischemia. All ventricular walls become  hyperdynamic and the ejection fraction improves.  · Normal-sized LV. Normal LV systolic function. Estimated EF 65- 70%. Normal LV septal wall motion. No regional wall motion abnormalities. Normal LV wall thickness. Normal diastolic filling pattern for age of the LV.    ECG  7/21/2022:  Sinus rhythm left anterior fascicular block    Problem List Items Addressed This Visit        High    Agatston coronary artery calcium score greater than 400 - Primary    Overview     2014 , 403 in LAD.  SE 9/16 unremarkable    8/21 stress echo negative for ischemia however reduced exercise tolerance           Current Assessment & Plan     Asymptomatic.  Reviewed guideline directed therapy.  His last LDL was 57.  He is physically active.           SVT (supraventricular tachycardia) (CMS/HCC)    Overview     Brief on stress test           Current Assessment & Plan     He has never experienced any arrhythmia symptoms and no intervention is needed.              Medium    Hypertension, essential    Overview     Overview:            Current Assessment & Plan     Blood pressure is little higher than average today and will reevaluate.           Hypercholesterolemia    Overview     2020 LDL 55           Current Assessment & Plan     Continue present treatment as his LDL is 54 on rosuvastatin 20 mg.           Nonrheumatic aortic valve insufficiency    Overview     Mild 2016, 2019, 2021               History of syncope    Overview     2/19 and once in 2018 ? etiology           Current Assessment & Plan     No recurrent syncope or near syncope.  No intervention.              Low    APC (atrial premature contractions)    Abnormal EKG    Overview     Possible septal infarct                 This patient note has been dictated using speech recognition software. Inadvertent speech recognition errors should be disregarded. Please do not hesitate to call my office for clarifications.    Carlos Manuel Peña,   8/18/2022 4:44 PM

## 2022-08-18 NOTE — ASSESSMENT & PLAN NOTE
Asymptomatic.  Reviewed guideline directed therapy.  His last LDL was 57.  He is physically active.

## 2022-09-21 RX ORDER — OLMESARTAN MEDOXOMIL AND HYDROCHLOROTHIAZIDE 40/12.5 40; 12.5 MG/1; MG/1
TABLET ORAL
Qty: 90 TABLET | Refills: 6 | Status: SHIPPED | OUTPATIENT
Start: 2022-09-21 | End: 2023-11-06

## 2022-12-15 DIAGNOSIS — E78.00 HYPERCHOLESTEROLEMIA: Primary | ICD-10-CM

## 2022-12-15 RX ORDER — EZETIMIBE 10 MG/1
TABLET ORAL
Qty: 90 TABLET | Refills: 3 | Status: SHIPPED | OUTPATIENT
Start: 2022-12-15 | End: 2023-11-06

## 2022-12-16 ENCOUNTER — TRANSCRIBE ORDERS (OUTPATIENT)
Dept: LAB | Age: 77
End: 2022-12-16

## 2022-12-16 ENCOUNTER — APPOINTMENT (OUTPATIENT)
Dept: LAB | Age: 77
End: 2022-12-16
Attending: UROLOGY
Payer: COMMERCIAL

## 2022-12-16 DIAGNOSIS — C67.4 MALIGNANT NEOPLASM OF POSTERIOR WALL OF BLADDER (CMS/HCC): ICD-10-CM

## 2022-12-16 DIAGNOSIS — C67.4 MALIGNANT NEOPLASM OF POSTERIOR WALL OF BLADDER (CMS/HCC): Primary | ICD-10-CM

## 2022-12-16 LAB
BUN SERPL-MCNC: 10 MG/DL (ref 8–20)
CREAT SERPL-MCNC: 1.2 MG/DL (ref 0.8–1.3)
GFR SERPL CREATININE-BSD FRML MDRD: 58.6 ML/MIN/1.73M*2

## 2022-12-16 PROCEDURE — 36415 COLL VENOUS BLD VENIPUNCTURE: CPT

## 2022-12-16 PROCEDURE — 82565 ASSAY OF CREATININE: CPT

## 2022-12-16 PROCEDURE — 84520 ASSAY OF UREA NITROGEN: CPT

## 2022-12-23 ENCOUNTER — TRANSCRIBE ORDERS (OUTPATIENT)
Dept: SCHEDULING | Age: 77
End: 2022-12-23

## 2022-12-23 DIAGNOSIS — C67.4 MALIGNANT NEOPLASM OF POSTERIOR WALL OF BLADDER (CMS/HCC): Primary | ICD-10-CM

## 2022-12-28 ENCOUNTER — HOSPITAL ENCOUNTER (OUTPATIENT)
Dept: RADIOLOGY | Age: 77
Discharge: HOME | End: 2022-12-28
Attending: UROLOGY
Payer: COMMERCIAL

## 2022-12-28 DIAGNOSIS — C67.4 MALIGNANT NEOPLASM OF POSTERIOR WALL OF BLADDER (CMS/HCC): ICD-10-CM

## 2022-12-28 PROCEDURE — 74178 CT ABD&PLV WO CNTR FLWD CNTR: CPT

## 2022-12-28 PROCEDURE — 63600105 HC IODINE BASED CONTRAST: Performed by: UROLOGY

## 2022-12-28 RX ADMIN — IOHEXOL 100 ML: 350 INJECTION, SOLUTION INTRAVENOUS at 13:57

## 2023-02-14 ASSESSMENT — ENCOUNTER SYMPTOMS
DIAPHORESIS: 0
ABDOMINAL PAIN: 0
FALLS: 0
FEVER: 0
TREMORS: 0
WHEEZING: 0
POLYDIPSIA: 0
DYSURIA: 0
ALTERED MENTAL STATUS: 0
HEMATEMESIS: 0
SPUTUM PRODUCTION: 0
MYALGIAS: 0
HEMATOCHEZIA: 0
HALLUCINATIONS: 0
FOCAL WEAKNESS: 0
BLURRED VISION: 0
ODYNOPHAGIA: 0
HEMOPTYSIS: 0
BRIEF PARALYSIS: 0
HOARSE VOICE: 0

## 2023-02-14 NOTE — PROGRESS NOTES
Cardiology Outpatient  Progress note    Quentin Auguste Jr. is a 77 y.o. male  who presents for ongoing cardiovascular management..    Summary: Coronary calcium score in 2014 was 405 of which 403 was in the LAD.  2018 stress echo no ischemia.  Normal systolic function.  Brief SVT noted on a stress test.  Hypertension, hypercholesterolemia, APCs, previous syncope. Bladder Cancer treated with BCG bladder infusion.    Interval HX: Pt presents today for routine follow up.Denies angina, exertional dyspnea, orthopnea, palpitation, syncope, near syncope, claudication, focal neurologic symptoms, or medication side effects. Overall he is feeling well. He reports little exercise but he does play golf frequently. On exam today his blood pressure and heart rate are stable. He has gained about 9 pounds. Most recent blood work reviewed. LDL was 54.    Past medical, family, social history were reviewed and there has been no significant interval change.       Past Medical History:   Diagnosis Date   • APC (atrial premature contractions) 4/8/2018   • Atypical chest pain 10/10/2018   • Cancer (CMS/HCC)     Bladder cancer treated with BCG   • Cataracts, bilateral    • Coronary artery disease involving native coronary artery of native heart without angina pectoris 4/8/2018 2014 , 403 in LAD.  SE 9/16 unremarkable   • Hypercholesterolemia 4/8/2018   • Hypertension, essential 4/8/2018    Overview:    • Nonrheumatic aortic valve insufficiency 4/8/2018    Mild 2016  Overview:    • SVT (supraventricular tachycardia) (CMS/HCC) 4/8/2018    Brief on stress test   • Syncope and collapse 4/30/2019       :   Past Surgical History:   Procedure Laterality Date   • BACK SURGERY  2001   • HERNIA REPAIR     • ROTATOR CUFF REPAIR Left    • TONSILLECTOMY         Allergies:   Lisinopril    Current Outpatient Medications   Medication Sig Dispense Refill   • aspirin 81 mg chewable tablet 81 mg.     • cholecalciferol, vitamin D3, 25 mcg (1,000  unit) capsule No SIG Entered     • ezetimibe (ZETIA) 10 mg tablet TAKE 1 TABLET BY MOUTH EVERY DAY AT NIGHT 90 tablet 3   • metoprolol tartrate (LOPRESSOR) 25 mg tablet TAKE 1 TABLET BY MOUTH TWICE A  tablet 3   • olmesartan-hydrochlorothiazide (BENICAR HCT) 40-12.5 mg per tablet TAKE 1 TABLET BY MOUTH EVERY DAY 90 tablet 6   • omega 3-dha-epa-fish oil 1,000 mg (120 mg-180 mg) capsule No SIG Entered     • rosuvastatin (CRESTOR) 20 mg tablet TAKE 1 TABLET BY MOUTH EVERY DAY 90 tablet 3   • zinc 50 mg tablet      • tadalafiL (CIALIS) 5 mg tablet Take 5 mg by mouth once daily.       No current facility-administered medications for this visit.          Social History     Socioeconomic History   • Marital status:      Spouse name: None   • Number of children: None   • Years of education: None   • Highest education level: None   Tobacco Use   • Smoking status: Never   • Smokeless tobacco: Never   Substance and Sexual Activity   • Alcohol use: Yes     Comment: Wine, with meals   • Drug use: No          Family History   Problem Relation Age of Onset   • Heart failure Biological Father        Review of Systems   Constitutional: Negative for diaphoresis and fever.   HENT: Negative for hoarse voice and odynophagia.    Eyes: Negative for blurred vision and visual disturbance.   Cardiovascular: Negative for chest pain, claudication, cyanosis, dyspnea on exertion, irregular heartbeat, leg swelling, near-syncope, orthopnea, palpitations, paroxysmal nocturnal dyspnea and syncope.   Respiratory: Negative for hemoptysis, sputum production and wheezing.    Endocrine: Negative for cold intolerance, heat intolerance and polydipsia.   Skin: Negative for rash.   Musculoskeletal: Negative for falls, muscle weakness and myalgias.   Gastrointestinal: Negative for abdominal pain, hematemesis and hematochezia.   Genitourinary: Negative for dysuria.   Neurological: Negative for brief paralysis, focal weakness and tremors.  "  Psychiatric/Behavioral: Negative for altered mental status and hallucinations.       Objective     Visit Vitals  /78 (BP Location: Right upper arm, Patient Position: Sitting)   Pulse 73   Resp 16   Ht 1.702 m (5' 7\")   Wt 71.6 kg (157 lb 12.8 oz)   SpO2 97%   BMI 24.71 kg/m²     Wt Readings from Last 3 Encounters:   02/16/23 71.6 kg (157 lb 12.8 oz)   08/18/22 67.2 kg (148 lb 3.2 oz)   02/24/22 70.8 kg (156 lb)         Physical Exam  Vitals reviewed.   Constitutional:       Appearance: He is well-developed. He is not diaphoretic.   HENT:      Head: Atraumatic.   Eyes:      General: No scleral icterus.     Conjunctiva/sclera: Conjunctivae normal.   Neck:      Thyroid: No thyromegaly.      Vascular: No carotid bruit or JVD.      Trachea: No tracheal deviation.   Cardiovascular:      Rate and Rhythm: Normal rate and regular rhythm.      Heart sounds: Normal heart sounds. No murmur heard.    No friction rub. No gallop.   Pulmonary:      Effort: Pulmonary effort is normal. No respiratory distress.      Breath sounds: Normal breath sounds. No wheezing or rales.   Abdominal:      General: Bowel sounds are normal. There is no distension.      Palpations: Abdomen is soft.   Musculoskeletal:         General: No tenderness.   Skin:     General: Skin is warm and dry.   Neurological:      Mental Status: He is alert and oriented to person, place, and time.      Cranial Nerves: No cranial nerve deficit.          Labs   Lab Results   Component Value Date    WBC 6.14 07/21/2022    HGB 14.3 07/21/2022    HCT 42.6 07/21/2022     07/21/2022    CHOL 107 07/21/2022    TRIG 82 07/21/2022    HDL 37 (L) 07/21/2022    LDLCALC 54 07/21/2022    ALT 17 07/21/2022    AST 20 07/21/2022     07/21/2022    K 4.1 07/21/2022     07/21/2022    CREATININE 1.2 12/16/2022    BUN 10 12/16/2022    CO2 22 07/21/2022    GLUCOSE 93 07/21/2022       Cardiac Imaging    ECHOCARDIOGRAM STRESS TEST 08/24/2021    Interpretation Summary  · " Below average exercise capacity completing 5 minutes and 14 seconds of Aj exercise protocol, 7 METs workload, without angina.  · Stress ECG does not meet criteria for ischemia. Post-stress echocardiogram does not meet criteria for ischemia. All ventricular walls become hyperdynamic and the ejection fraction improves.  · Normal-sized LV. Normal LV systolic function. Estimated EF 65- 70%. Normal LV septal wall motion. No regional wall motion abnormalities. Normal LV wall thickness. Normal diastolic filling pattern for age of the LV.    ECG  7/21/2022:  Sinus rhythm left anterior fascicular block    Problem List Items Addressed This Visit        High    Agatston coronary artery calcium score greater than 400    Overview     2014 , 403 in LAD.  SE 9/16 unremarkable    8/21 stress echo negative for ischemia however reduced exercise tolerance         Current Assessment & Plan     Pt remains asymptomatic. Active playing golf but no formal exercise. Most recent LDL was 54. Continue guideline directed therapy.         Relevant Orders    Comprehensive metabolic panel    Lipid panel    SVT (supraventricular tachycardia) (CMS/HCC) - Primary    Overview     Brief on stress test         Current Assessment & Plan     No arrhythmia symptoms reported. No intervention needed.            Medium    Hypertension, essential    Overview     Overview:          Current Assessment & Plan     Blood pressure is stable today, 128/78. No change in medical therapy.         Hypercholesterolemia    Overview     2020 LDL 55         Current Assessment & Plan     Continue Rosuvastatin 20 mg daily. LDL was 54. Labs will be rechecked in the next few months.         Nonrheumatic aortic valve insufficiency    Overview     Mild 2016, 2019, 2021             Current Assessment & Plan     Will continue to monitor with routine echoes.         History of syncope    Overview     2/19 and once in 2018 ? etiology         Current Assessment & Plan     No  recurrent symptoms reported.            Low    APC (atrial premature contractions)    Abnormal EKG    Overview     Possible septal infarct            This patient note has been dictated using speech recognition software. Inadvertent speech recognition errors should be disregarded. Please do not hesitate to call my office for clarifications.    Carlos Manuel Peña, DO  2/16/2023 4:44 PM

## 2023-02-16 ENCOUNTER — OFFICE VISIT (OUTPATIENT)
Dept: CARDIOLOGY | Facility: CLINIC | Age: 78
End: 2023-02-16
Payer: COMMERCIAL

## 2023-02-16 VITALS
HEART RATE: 73 BPM | WEIGHT: 157.8 LBS | OXYGEN SATURATION: 97 % | SYSTOLIC BLOOD PRESSURE: 128 MMHG | BODY MASS INDEX: 24.77 KG/M2 | HEIGHT: 67 IN | RESPIRATION RATE: 16 BRPM | DIASTOLIC BLOOD PRESSURE: 78 MMHG

## 2023-02-16 DIAGNOSIS — I47.10 SVT (SUPRAVENTRICULAR TACHYCARDIA) (CMS/HCC): Primary | ICD-10-CM

## 2023-02-16 DIAGNOSIS — I49.1 APC (ATRIAL PREMATURE CONTRACTIONS): ICD-10-CM

## 2023-02-16 DIAGNOSIS — Z87.898 HISTORY OF SYNCOPE: ICD-10-CM

## 2023-02-16 DIAGNOSIS — E78.00 HYPERCHOLESTEROLEMIA: ICD-10-CM

## 2023-02-16 DIAGNOSIS — I35.1 NONRHEUMATIC AORTIC VALVE INSUFFICIENCY: ICD-10-CM

## 2023-02-16 DIAGNOSIS — R94.31 ABNORMAL EKG: ICD-10-CM

## 2023-02-16 DIAGNOSIS — R93.1 AGATSTON CORONARY ARTERY CALCIUM SCORE GREATER THAN 400: ICD-10-CM

## 2023-02-16 DIAGNOSIS — I10 HYPERTENSION, ESSENTIAL: ICD-10-CM

## 2023-02-16 PROCEDURE — 3008F BODY MASS INDEX DOCD: CPT | Performed by: INTERNAL MEDICINE

## 2023-02-16 PROCEDURE — 99214 OFFICE O/P EST MOD 30 MIN: CPT | Performed by: INTERNAL MEDICINE

## 2023-02-16 ASSESSMENT — ENCOUNTER SYMPTOMS
IRREGULAR HEARTBEAT: 0
PALPITATIONS: 0
DYSPNEA ON EXERTION: 0
ORTHOPNEA: 0
PND: 0
NEAR-SYNCOPE: 0
SYNCOPE: 0
CLAUDICATION: 0

## 2023-02-16 NOTE — ASSESSMENT & PLAN NOTE
Pt remains asymptomatic. Active playing golf but no formal exercise. Most recent LDL was 54. Continue guideline directed therapy.

## 2023-04-03 DIAGNOSIS — R93.1 AGATSTON CORONARY ARTERY CALCIUM SCORE GREATER THAN 400: Primary | ICD-10-CM

## 2023-04-03 DIAGNOSIS — E78.00 HYPERCHOLESTEROLEMIA: ICD-10-CM

## 2023-04-03 RX ORDER — ROSUVASTATIN CALCIUM 20 MG/1
20 TABLET, COATED ORAL DAILY
Qty: 90 TABLET | Refills: 3 | Status: SHIPPED | OUTPATIENT
Start: 2023-04-03 | End: 2024-04-01

## 2023-04-03 NOTE — TELEPHONE ENCOUNTER
Rx request for rosuvastatin 20mg received from [facility].     Medicine Refill Request    Last Office: 2/16/2023   Last Consult Visit: Visit date not found  Last Telemedicine Visit: Visit date not found    Next Appointment: 8/22/2023      Current Outpatient Medications:   •  aspirin 81 mg chewable tablet, 81 mg., Disp: , Rfl:   •  cholecalciferol, vitamin D3, 25 mcg (1,000 unit) capsule, No SIG Entered, Disp: , Rfl:   •  ezetimibe (ZETIA) 10 mg tablet, TAKE 1 TABLET BY MOUTH EVERY DAY AT NIGHT, Disp: 90 tablet, Rfl: 3  •  metoprolol tartrate (LOPRESSOR) 25 mg tablet, TAKE 1 TABLET BY MOUTH TWICE A DAY, Disp: 180 tablet, Rfl: 3  •  olmesartan-hydrochlorothiazide (BENICAR HCT) 40-12.5 mg per tablet, TAKE 1 TABLET BY MOUTH EVERY DAY, Disp: 90 tablet, Rfl: 6  •  omega 3-dha-epa-fish oil 1,000 mg (120 mg-180 mg) capsule, No SIG Entered, Disp: , Rfl:   •  rosuvastatin (CRESTOR) 20 mg tablet, TAKE 1 TABLET BY MOUTH EVERY DAY, Disp: 90 tablet, Rfl: 3  •  tadalafiL (CIALIS) 5 mg tablet, Take 5 mg by mouth once daily., Disp: , Rfl:   •  zinc 50 mg tablet, , Disp: , Rfl:       BP Readings from Last 3 Encounters:   02/16/23 128/78   08/18/22 138/80   02/24/22 (!) 148/64       Recent Lab results:  Lab Results   Component Value Date    CHOL 107 07/21/2022   ,   Lab Results   Component Value Date    HDL 37 (L) 07/21/2022   ,   Lab Results   Component Value Date    LDLCALC 54 07/21/2022   ,   Lab Results   Component Value Date    TRIG 82 07/21/2022        Lab Results   Component Value Date    GLUCOSE 93 07/21/2022   , No results found for: HGBA1C      Lab Results   Component Value Date    CREATININE 1.2 12/16/2022       No results found for: TSH

## 2023-05-18 DIAGNOSIS — I10 HYPERTENSION, ESSENTIAL: Primary | ICD-10-CM

## 2023-05-18 DIAGNOSIS — I49.1 APC (ATRIAL PREMATURE CONTRACTIONS): ICD-10-CM

## 2023-05-18 DIAGNOSIS — I47.10 SVT (SUPRAVENTRICULAR TACHYCARDIA) (CMS/HCC): ICD-10-CM

## 2023-05-18 RX ORDER — METOPROLOL TARTRATE 25 MG/1
25 TABLET, FILM COATED ORAL 2 TIMES DAILY
Qty: 180 TABLET | Refills: 2 | Status: SHIPPED | OUTPATIENT
Start: 2023-05-18 | End: 2024-02-16

## 2023-05-18 NOTE — TELEPHONE ENCOUNTER
Rx request for metoprolol 25mg received from [facility].    Medicine Refill Request    Last Office: 2/16/2023   Last Consult Visit: Visit date not found  Last Telemedicine Visit: Visit date not found    Next Appointment: 8/22/2023      Current Outpatient Medications:   •  aspirin 81 mg chewable tablet, 81 mg., Disp: , Rfl:   •  cholecalciferol, vitamin D3, 25 mcg (1,000 unit) capsule, No SIG Entered, Disp: , Rfl:   •  ezetimibe (ZETIA) 10 mg tablet, TAKE 1 TABLET BY MOUTH EVERY DAY AT NIGHT, Disp: 90 tablet, Rfl: 3  •  metoprolol tartrate (LOPRESSOR) 25 mg tablet, TAKE 1 TABLET BY MOUTH TWICE A DAY, Disp: 180 tablet, Rfl: 3  •  olmesartan-hydrochlorothiazide (BENICAR HCT) 40-12.5 mg per tablet, TAKE 1 TABLET BY MOUTH EVERY DAY, Disp: 90 tablet, Rfl: 6  •  omega 3-dha-epa-fish oil 1,000 mg (120 mg-180 mg) capsule, No SIG Entered, Disp: , Rfl:   •  rosuvastatin (CRESTOR) 20 mg tablet, Take 1 tablet (20 mg total) by mouth daily., Disp: 90 tablet, Rfl: 3  •  tadalafiL (CIALIS) 5 mg tablet, Take 5 mg by mouth once daily., Disp: , Rfl:   •  zinc 50 mg tablet, , Disp: , Rfl:       BP Readings from Last 3 Encounters:   02/16/23 128/78   08/18/22 138/80   02/24/22 (!) 148/64       Recent Lab results:  Lab Results   Component Value Date    CHOL 107 07/21/2022   ,   Lab Results   Component Value Date    HDL 37 (L) 07/21/2022   ,   Lab Results   Component Value Date    LDLCALC 54 07/21/2022   ,   Lab Results   Component Value Date    TRIG 82 07/21/2022        Lab Results   Component Value Date    GLUCOSE 93 07/21/2022   , No results found for: HGBA1C      Lab Results   Component Value Date    CREATININE 1.2 12/16/2022       No results found for: TSH

## 2023-08-16 ASSESSMENT — ENCOUNTER SYMPTOMS
FALLS: 0
SYNCOPE: 0
SPUTUM PRODUCTION: 0
PALPITATIONS: 0
PND: 0
IRREGULAR HEARTBEAT: 0
FOCAL WEAKNESS: 0
DIAPHORESIS: 0
NEAR-SYNCOPE: 0
TREMORS: 0
HEMATEMESIS: 0
DYSURIA: 0
HEMOPTYSIS: 0
HEMATOCHEZIA: 0
HALLUCINATIONS: 0
ORTHOPNEA: 0
WHEEZING: 0
ODYNOPHAGIA: 0
BLURRED VISION: 0
POLYDIPSIA: 0
DYSPNEA ON EXERTION: 0
ABDOMINAL PAIN: 0
BRIEF PARALYSIS: 0
ALTERED MENTAL STATUS: 0
MYALGIAS: 0
HOARSE VOICE: 0
CLAUDICATION: 0
FEVER: 0

## 2023-08-16 NOTE — PROGRESS NOTES
Cardiology Outpatient  Progress note    Quentin Auguste Jr. is a 78 y.o. male  who presents for ongoing cardiovascular management..    Summary: Coronary calcium score in 2014 was 405 of which 403 was in the LAD.  2018 stress echo no ischemia.  Normal systolic function.  Brief SVT noted on a stress test.  Hypertension, hypercholesterolemia, APCs, previous syncope. Bladder Cancer treated with BCG bladder infusion.    Interval HX:   Denies angina, exertional dyspnea, orthopnea, palpitation, syncope, near syncope, claudication, focal neurologic symptoms, or medication side effects.  Is physically active and playing some golf but not regular daily exercise.  Absolutely no cardiovascular symptoms.  Compliant with meds.  Labs were reviewed from yesterday and discussed.    Past medical, family, social history were reviewed and there has been no significant interval change.       Past Medical History:   Diagnosis Date   • APC (atrial premature contractions) 4/8/2018   • Atypical chest pain 10/10/2018   • Cancer (CMS/HCC)     Bladder cancer treated with BCG   • Cataracts, bilateral    • Coronary artery disease involving native coronary artery of native heart without angina pectoris 4/8/2018 2014 , 403 in LAD.  SE 9/16 unremarkable   • Hypercholesterolemia 4/8/2018   • Hypertension, essential 4/8/2018    Overview:    • Nonrheumatic aortic valve insufficiency 4/8/2018    Mild 2016  Overview:    • SVT (supraventricular tachycardia) (CMS/HCC) 4/8/2018    Brief on stress test   • Syncope and collapse 4/30/2019       :   Past Surgical History:   Procedure Laterality Date   • BACK SURGERY  2001   • HERNIA REPAIR     • ROTATOR CUFF REPAIR Left    • TONSILLECTOMY         Allergies:   Lisinopril    Current Outpatient Medications   Medication Sig Dispense Refill   • aspirin 81 mg chewable tablet 81 mg.     • cholecalciferol, vitamin D3, 25 mcg (1,000 unit) capsule No SIG Entered     • ezetimibe (ZETIA) 10 mg tablet TAKE 1  TABLET BY MOUTH EVERY DAY AT NIGHT 90 tablet 3   • metoprolol tartrate (LOPRESSOR) 25 mg tablet Take 1 tablet (25 mg total) by mouth 2 (two) times a day. 180 tablet 2   • olmesartan-hydrochlorothiazide (BENICAR HCT) 40-12.5 mg per tablet TAKE 1 TABLET BY MOUTH EVERY DAY 90 tablet 6   • omega 3-dha-epa-fish oil 1,000 mg (120 mg-180 mg) capsule No SIG Entered     • rosuvastatin (CRESTOR) 20 mg tablet Take 1 tablet (20 mg total) by mouth daily. 90 tablet 3   • tadalafiL (CIALIS) 5 mg tablet Take 5 mg by mouth once daily.     • zinc 50 mg tablet        No current facility-administered medications for this visit.          Social History     Socioeconomic History   • Marital status:      Spouse name: None   • Number of children: None   • Years of education: None   • Highest education level: None   Tobacco Use   • Smoking status: Never   • Smokeless tobacco: Never   Substance and Sexual Activity   • Alcohol use: Yes     Comment: Wine, with meals   • Drug use: No          Family History   Problem Relation Age of Onset   • Heart failure Biological Father        Review of Systems   Constitutional: Negative for diaphoresis and fever.   HENT: Negative for hoarse voice and odynophagia.    Eyes: Negative for blurred vision and visual disturbance.   Cardiovascular: Negative for chest pain, claudication, cyanosis, dyspnea on exertion, irregular heartbeat, leg swelling, near-syncope, orthopnea, palpitations, paroxysmal nocturnal dyspnea and syncope.   Respiratory: Negative for hemoptysis, sputum production and wheezing.    Endocrine: Negative for cold intolerance, heat intolerance and polydipsia.   Skin: Negative for rash.   Musculoskeletal: Negative for falls, muscle weakness and myalgias.   Gastrointestinal: Negative for abdominal pain, hematemesis and hematochezia.   Genitourinary: Negative for dysuria.   Neurological: Negative for brief paralysis, focal weakness and tremors.   Psychiatric/Behavioral: Negative for altered  "mental status and hallucinations.       Objective     Visit Vitals  /66   Pulse 63   Ht 1.702 m (5' 7\")   Wt 71.2 kg (157 lb)   SpO2 99%   BMI 24.59 kg/m²     Wt Readings from Last 3 Encounters:   08/22/23 71.2 kg (157 lb)   02/16/23 71.6 kg (157 lb 12.8 oz)   08/18/22 67.2 kg (148 lb 3.2 oz)         Physical Exam  Vitals reviewed.   Constitutional:       Appearance: He is well-developed. He is not diaphoretic.   HENT:      Head: Atraumatic.   Eyes:      General: No scleral icterus.     Conjunctiva/sclera: Conjunctivae normal.   Neck:      Thyroid: No thyromegaly.      Vascular: No carotid bruit or JVD.      Trachea: No tracheal deviation.   Cardiovascular:      Rate and Rhythm: Normal rate and regular rhythm.      Heart sounds: Normal heart sounds. No murmur heard.     No friction rub. No gallop.   Pulmonary:      Effort: Pulmonary effort is normal. No respiratory distress.      Breath sounds: Normal breath sounds. No wheezing or rales.   Abdominal:      General: Bowel sounds are normal. There is no distension.      Palpations: Abdomen is soft.   Musculoskeletal:         General: No tenderness.   Skin:     General: Skin is warm and dry.   Neurological:      Mental Status: He is alert and oriented to person, place, and time.      Cranial Nerves: No cranial nerve deficit.          Labs   Lab Results   Component Value Date    WBC 6.43 08/21/2023    HGB 14.8 08/21/2023    HCT 43.5 08/21/2023     08/21/2023    CHOL 93 08/21/2023    TRIG 67 08/21/2023    HDL 39 (L) 08/21/2023    LDLCALC 41 08/21/2023    ALT 14 08/21/2023    AST 16 08/21/2023     08/21/2023    K 4.5 08/21/2023     08/21/2023    CREATININE 1.0 08/21/2023    BUN 13 08/21/2023    CO2 30 08/21/2023    GLUCOSE 85 08/21/2023       Cardiac Imaging    ECHOCARDIOGRAM STRESS TEST 08/24/2021    Interpretation Summary  · Below average exercise capacity completing 5 minutes and 14 seconds of Aj exercise protocol, 7 METs workload, without " angina.  · Stress ECG does not meet criteria for ischemia. Post-stress echocardiogram does not meet criteria for ischemia. All ventricular walls become hyperdynamic and the ejection fraction improves.  · Normal-sized LV. Normal LV systolic function. Estimated EF 65- 70%. Normal LV septal wall motion. No regional wall motion abnormalities. Normal LV wall thickness. Normal diastolic filling pattern for age of the LV.    ECG  8/22/2023  Sinus rhythm with left anterior fascicular block    Problem List Items Addressed This Visit        High    Agatston coronary artery calcium score greater than 400    Overview     2014 , 403 in LAD.  SE 9/16 unremarkable    8/21 stress echo negative for ischemia however reduced exercise tolerance         Current Assessment & Plan     He remains asymptomatic.  Recommended guideline directed lifestyle, exercise, diet and medical therapy.         SVT (supraventricular tachycardia) (CMS/[facility]) - Primary    Overview     Brief on stress test         Current Assessment & Plan     No symptoms of any arrhythmias.  No change.            Medium    Hypertension, essential    Overview     Overview:          Current Assessment & Plan     Second blood pressure check manually 124/66 and no change.  Same meds.         Relevant Orders    [facility] MUSE ECG 12 lead (clinic performed) (Completed)    Hypercholesterolemia    Overview     2020 LDL 55         Current Assessment & Plan     Well-controlled with an LDL of 41 yesterday's labs.  Continue medical management         Nonrheumatic aortic valve insufficiency    Overview     Mild 2016, 2019, 2021             Current Assessment & Plan     This can be reevaluated on 3-5-year intervals.         History of syncope    Overview     2/19 and once in 2018 ? etiology         Current Assessment & Plan     No recurrence.  No intervention.            Low    APC (atrial premature contractions)       This patient note has been dictated using speech recognition  software. Inadvertent speech recognition errors should be disregarded. Please do not hesitate to call my office for clarifications.      Carlos Manuel Peña DO, FACC

## 2023-08-21 ENCOUNTER — APPOINTMENT (OUTPATIENT)
Dept: LAB | Age: 78
End: 2023-08-21
Attending: INTERNAL MEDICINE
Payer: COMMERCIAL

## 2023-08-21 DIAGNOSIS — I10 HYPERTENSION, ESSENTIAL: ICD-10-CM

## 2023-08-21 DIAGNOSIS — R93.1 AGATSTON CORONARY ARTERY CALCIUM SCORE GREATER THAN 400: Primary | ICD-10-CM

## 2023-08-21 DIAGNOSIS — E78.00 HYPERCHOLESTEROLEMIA: ICD-10-CM

## 2023-08-21 LAB
ALBUMIN SERPL-MCNC: 4 G/DL (ref 3.5–5.7)
ALP SERPL-CCNC: 56 IU/L (ref 34–125)
ALT SERPL-CCNC: 14 IU/L (ref 7–52)
ANION GAP SERPL CALC-SCNC: 5 MEQ/L (ref 3–15)
AST SERPL-CCNC: 16 IU/L (ref 13–39)
BASOPHILS # BLD: 0.04 K/UL (ref 0.01–0.1)
BASOPHILS NFR BLD: 0.6 %
BILIRUB SERPL-MCNC: 0.7 MG/DL (ref 0.3–1.2)
BUN SERPL-MCNC: 13 MG/DL (ref 7–25)
CALCIUM SERPL-MCNC: 9.2 MG/DL (ref 8.6–10.3)
CHLORIDE SERPL-SCNC: 107 MEQ/L (ref 98–107)
CHOLEST SERPL-MCNC: 93 MG/DL
CO2 SERPL-SCNC: 30 MEQ/L (ref 21–31)
CREAT SERPL-MCNC: 1 MG/DL (ref 0.7–1.3)
DIFFERENTIAL METHOD BLD: ABNORMAL
EOSINOPHIL # BLD: 0.22 K/UL (ref 0.04–0.54)
EOSINOPHIL NFR BLD: 3.4 %
ERYTHROCYTE [DISTWIDTH] IN BLOOD BY AUTOMATED COUNT: 14.3 % (ref 11.6–14.4)
GFR SERPL CREATININE-BSD FRML MDRD: >60 ML/MIN/1.73M*2
GLUCOSE SERPL-MCNC: 85 MG/DL (ref 70–99)
HCT VFR BLDCO AUTO: 43.5 % (ref 40.1–51)
HDLC SERPL-MCNC: 39 MG/DL
HDLC SERPL: 2.4 {RATIO}
HGB BLD-MCNC: 14.8 G/DL (ref 13.7–17.5)
IMM GRANULOCYTES # BLD AUTO: 0.1 K/UL (ref 0–0.08)
IMM GRANULOCYTES NFR BLD AUTO: 1.6 %
LDLC SERPL CALC-MCNC: 41 MG/DL
LYMPHOCYTES # BLD: 1.43 K/UL (ref 1.2–3.5)
LYMPHOCYTES NFR BLD: 22.2 %
MCH RBC QN AUTO: 30.3 PG (ref 28–33.2)
MCHC RBC AUTO-ENTMCNC: 34 G/DL (ref 32.2–36.5)
MCV RBC AUTO: 89 FL (ref 83–98)
MONOCYTES # BLD: 0.62 K/UL (ref 0.3–1)
MONOCYTES NFR BLD: 9.6 %
NEUTROPHILS # BLD: 4.02 K/UL (ref 1.7–7)
NEUTS SEG NFR BLD: 62.6 %
NONHDLC SERPL-MCNC: 54 MG/DL
NRBC BLD-RTO: 0 %
PDW BLD AUTO: 9.6 FL (ref 9.4–12.4)
PLATELET # BLD AUTO: 177 K/UL (ref 150–350)
POTASSIUM SERPL-SCNC: 4.5 MEQ/L (ref 3.5–5.1)
PROT SERPL-MCNC: 6.5 G/DL (ref 6–8.2)
RBC # BLD AUTO: 4.89 M/UL (ref 4.5–5.8)
SODIUM SERPL-SCNC: 142 MEQ/L (ref 136–145)
TRIGL SERPL-MCNC: 67 MG/DL
WBC # BLD AUTO: 6.43 K/UL (ref 3.8–10.5)

## 2023-08-21 PROCEDURE — 36415 COLL VENOUS BLD VENIPUNCTURE: CPT

## 2023-08-21 PROCEDURE — 80061 LIPID PANEL: CPT

## 2023-08-21 PROCEDURE — 80053 COMPREHEN METABOLIC PANEL: CPT

## 2023-08-21 PROCEDURE — 85025 COMPLETE CBC W/AUTO DIFF WBC: CPT

## 2023-08-22 ENCOUNTER — OFFICE VISIT (OUTPATIENT)
Dept: CARDIOLOGY | Facility: CLINIC | Age: 78
End: 2023-08-22
Payer: COMMERCIAL

## 2023-08-22 VITALS
OXYGEN SATURATION: 99 % | BODY MASS INDEX: 24.64 KG/M2 | SYSTOLIC BLOOD PRESSURE: 124 MMHG | WEIGHT: 157 LBS | HEART RATE: 63 BPM | DIASTOLIC BLOOD PRESSURE: 66 MMHG | HEIGHT: 67 IN

## 2023-08-22 DIAGNOSIS — I10 HYPERTENSION, ESSENTIAL: ICD-10-CM

## 2023-08-22 DIAGNOSIS — R93.1 AGATSTON CORONARY ARTERY CALCIUM SCORE GREATER THAN 400: ICD-10-CM

## 2023-08-22 DIAGNOSIS — Z87.898 HISTORY OF SYNCOPE: ICD-10-CM

## 2023-08-22 DIAGNOSIS — I49.1 APC (ATRIAL PREMATURE CONTRACTIONS): ICD-10-CM

## 2023-08-22 DIAGNOSIS — I47.10 SVT (SUPRAVENTRICULAR TACHYCARDIA) (CMS/HCC): Primary | ICD-10-CM

## 2023-08-22 DIAGNOSIS — I35.1 NONRHEUMATIC AORTIC VALVE INSUFFICIENCY: ICD-10-CM

## 2023-08-22 DIAGNOSIS — E78.00 HYPERCHOLESTEROLEMIA: ICD-10-CM

## 2023-08-22 LAB
ATRIAL RATE: 62
P AXIS: 77
PR INTERVAL: 192
QRS DURATION: 108
QT INTERVAL: 388
QTC CALCULATION(BAZETT): 393
R AXIS: -50
T WAVE AXIS: 46
VENTRICULAR RATE: 62

## 2023-08-22 PROCEDURE — 3078F DIAST BP <80 MM HG: CPT | Performed by: INTERNAL MEDICINE

## 2023-08-22 PROCEDURE — 3008F BODY MASS INDEX DOCD: CPT | Performed by: INTERNAL MEDICINE

## 2023-08-22 PROCEDURE — 99214 OFFICE O/P EST MOD 30 MIN: CPT | Performed by: INTERNAL MEDICINE

## 2023-08-22 PROCEDURE — 93000 ELECTROCARDIOGRAM COMPLETE: CPT | Performed by: INTERNAL MEDICINE

## 2023-08-22 PROCEDURE — 3074F SYST BP LT 130 MM HG: CPT | Performed by: INTERNAL MEDICINE

## 2023-08-22 NOTE — LETTER
August 22, 2023                                                                                                                                                                                                                                                                                                               DEREJE Chun DO  [address] [place] [id number]    Patient: Quentin Auguste Jr.  YOB: 1945  Date of Visit: 8/22/2023    Dear Dr. Chun:    Thank you for referring Quentin Auguste Jr. to me for evaluation. Below are the relevant portions of my assessment and plan of care.    If you have questions, please do not hesitate to call me. I look forward to following Quentin along with you.         Sincerely,        Carlos Manuel Peña,         CC: No Recipients          Assessment and Plan:  Problem List Items Addressed This Visit          High    Agatston coronary artery calcium score greater than 400     He remains asymptomatic.  Recommended guideline directed lifestyle, exercise, diet and medical therapy.         SVT (supraventricular tachycardia) (CMS/HCC) - Primary     No symptoms of any arrhythmias.  No change.            Medium    Hypertension, essential     Second blood pressure check manually 124/66 and no change.  Same meds.         Relevant Orders    [facility] MUSE ECG 12 lead (clinic performed) (Completed)    Hypercholesterolemia     Well-controlled with an LDL of 41 yesterday's labs.  Continue medical management         Nonrheumatic aortic valve insufficiency     This can be reevaluated on 3-5-year intervals.         History of syncope     No recurrence.  No intervention.            Low    APC (atrial premature contractions)

## 2023-08-22 NOTE — ASSESSMENT & PLAN NOTE
He remains asymptomatic.  Recommended guideline directed lifestyle, exercise, diet and medical therapy.

## 2023-11-05 DIAGNOSIS — E78.00 HYPERCHOLESTEROLEMIA: ICD-10-CM

## 2023-11-06 RX ORDER — EZETIMIBE 10 MG/1
TABLET ORAL
Qty: 90 TABLET | Refills: 3 | Status: SHIPPED | OUTPATIENT
Start: 2023-11-06 | End: 2024-12-06 | Stop reason: SDUPTHER

## 2023-11-06 RX ORDER — OLMESARTAN MEDOXOMIL AND HYDROCHLOROTHIAZIDE 40/12.5 40; 12.5 MG/1; MG/1
TABLET ORAL
Qty: 90 TABLET | Refills: 6 | Status: SHIPPED | OUTPATIENT
Start: 2023-11-06 | End: 2024-11-25

## 2024-02-16 DIAGNOSIS — I49.1 APC (ATRIAL PREMATURE CONTRACTIONS): ICD-10-CM

## 2024-02-16 DIAGNOSIS — I47.10 SVT (SUPRAVENTRICULAR TACHYCARDIA) (CMS/HCC): ICD-10-CM

## 2024-02-16 DIAGNOSIS — I10 HYPERTENSION, ESSENTIAL: ICD-10-CM

## 2024-02-16 RX ORDER — METOPROLOL TARTRATE 25 MG/1
25 TABLET, FILM COATED ORAL 2 TIMES DAILY
Qty: 180 TABLET | Refills: 2 | Status: SHIPPED | OUTPATIENT
Start: 2024-02-16 | End: 2024-11-15

## 2024-03-06 ASSESSMENT — ENCOUNTER SYMPTOMS
DIAPHORESIS: 0
ABDOMINAL PAIN: 0
BRIEF PARALYSIS: 0
SPUTUM PRODUCTION: 0
DYSURIA: 0
ODYNOPHAGIA: 0
ALTERED MENTAL STATUS: 0
POLYDIPSIA: 0
MYALGIAS: 0
HOARSE VOICE: 0
BLURRED VISION: 0
HEMATEMESIS: 0
HEMOPTYSIS: 0
FEVER: 0
HALLUCINATIONS: 0
HEMATOCHEZIA: 0
FALLS: 0
TREMORS: 0
WHEEZING: 0
FOCAL WEAKNESS: 0

## 2024-03-06 NOTE — PROGRESS NOTES
Cardiology Outpatient  Progress note    Quentin Auguste Jr. is a 78 y.o. male  who presents for ongoing cardiovascular management..    Summary: Coronary calcium score in 2014 was 405 of which 403 was in the LAD.  2018 stress echo no ischemia.  Normal systolic function.  Brief SVT noted on a stress test.  Hypertension, hypercholesterolemia, APCs, previous syncope. Bladder Cancer treated with BCG bladder infusion.    Interval HX: Pt presents today for routine follow up. Overall he is feeling well.  Denies angina, exertional dyspnea, orthopnea, palpitation, syncope, near syncope, claudication, focal neurologic symptoms, or medication side effects. He is physically active and playing some golf but not regular daily exercise.  Absolutely no cardiovascular symptoms.  Compliant with meds. Most recent labs were reviewed. LDL was 41.    Past medical, family, social history were reviewed and there has been no significant interval change.       Past Medical History:   Diagnosis Date   • APC (atrial premature contractions) 4/8/2018   • Atypical chest pain 10/10/2018   • Cancer (CMS/HCC)     Bladder cancer treated with BCG   • Cataracts, bilateral    • Coronary artery disease involving native coronary artery of native heart without angina pectoris 4/8/2018    2014 , 403 in LAD.  SE 9/16 unremarkable   • Hypercholesterolemia 4/8/2018   • Hypertension, essential 4/8/2018    Overview:    • Nonrheumatic aortic valve insufficiency 4/8/2018    Mild 2016  Overview:    • SVT (supraventricular tachycardia) 4/8/2018    Brief on stress test   • Syncope and collapse 4/30/2019       :   Past Surgical History:   Procedure Laterality Date   • BACK SURGERY  2001   • HERNIA REPAIR     • ROTATOR CUFF REPAIR Left    • TONSILLECTOMY         Allergies:   Lisinopril    Current Outpatient Medications   Medication Sig Dispense Refill   • aspirin 81 mg chewable tablet 81 mg.     • cholecalciferol, vitamin D3, 25 mcg (1,000 unit) capsule No SIG  "Entered     • ezetimibe (ZETIA) 10 mg tablet TAKE 1 TABLET BY MOUTH EVERY DAY AT NIGHT 90 tablet 3   • metoprolol tartrate (LOPRESSOR) 25 mg tablet TAKE 1 TABLET BY MOUTH TWICE A  tablet 2   • olmesartan-hydrochlorothiazide (BENICAR HCT) 40-12.5 mg per tablet TAKE 1 TABLET BY MOUTH EVERY DAY 90 tablet 6   • omega 3-dha-epa-fish oil 1,000 mg (120 mg-180 mg) capsule No SIG Entered     • rosuvastatin (CRESTOR) 20 mg tablet Take 1 tablet (20 mg total) by mouth daily. 90 tablet 3   • tadalafiL (CIALIS) 5 mg tablet Take 5 mg by mouth once daily.     • zinc 50 mg tablet        No current facility-administered medications for this visit.          Social History     Socioeconomic History   • Marital status:      Spouse name: None   • Number of children: None   • Years of education: None   • Highest education level: None   Tobacco Use   • Smoking status: Never   • Smokeless tobacco: Never   Substance and Sexual Activity   • Alcohol use: Yes     Comment: Wine, with meals   • Drug use: No          Family History   Problem Relation Age of Onset   • Heart failure Biological Father        Review of Systems   Constitutional: Negative for diaphoresis and fever.   HENT: Negative for hoarse voice and odynophagia.    Eyes: Negative for blurred vision and visual disturbance.   Respiratory: Negative for hemoptysis, sputum production and wheezing.    Endocrine: Negative for cold intolerance, heat intolerance and polydipsia.   Skin: Negative for rash.   Musculoskeletal: Negative for falls, muscle weakness and myalgias.   Gastrointestinal: Negative for abdominal pain, hematemesis and hematochezia.   Genitourinary: Negative for dysuria.   Neurological: Negative for brief paralysis, focal weakness and tremors.   Psychiatric/Behavioral: Negative for altered mental status and hallucinations.       Objective     Visit Vitals  /70 (BP Location: Left upper arm, Patient Position: Sitting)   Pulse 69   Ht 1.702 m (5' 7\")   Wt " 70.8 kg (156 lb)   SpO2 99%   BMI 24.43 kg/m²     Wt Readings from Last 3 Encounters:   03/14/24 70.8 kg (156 lb)   08/22/23 71.2 kg (157 lb)   02/16/23 71.6 kg (157 lb 12.8 oz)         Physical Exam  Vitals reviewed.   Constitutional:       Appearance: He is well-developed. He is not diaphoretic.   HENT:      Head: Atraumatic.   Eyes:      General: No scleral icterus.     Conjunctiva/sclera: Conjunctivae normal.   Neck:      Thyroid: No thyromegaly.      Vascular: No carotid bruit or JVD.      Trachea: No tracheal deviation.   Cardiovascular:      Rate and Rhythm: Normal rate and regular rhythm.      Heart sounds: Normal heart sounds. No murmur heard.     No friction rub. No gallop.   Pulmonary:      Effort: Pulmonary effort is normal. No respiratory distress.      Breath sounds: Normal breath sounds. No wheezing or rales.   Abdominal:      General: Bowel sounds are normal. There is no distension.      Palpations: Abdomen is soft.   Musculoskeletal:         General: No tenderness.   Skin:     General: Skin is warm and dry.   Neurological:      Mental Status: He is alert and oriented to person, place, and time.      Cranial Nerves: No cranial nerve deficit.          Labs   Lab Results   Component Value Date    WBC 6.43 08/21/2023    HGB 14.8 08/21/2023    HCT 43.5 08/21/2023     08/21/2023    CHOL 93 08/21/2023    TRIG 67 08/21/2023    HDL 39 (L) 08/21/2023    LDLCALC 41 08/21/2023    ALT 14 08/21/2023    AST 16 08/21/2023     08/21/2023    K 4.5 08/21/2023     08/21/2023    CREATININE 1.0 08/21/2023    BUN 13 08/21/2023    CO2 30 08/21/2023    GLUCOSE 85 08/21/2023       Cardiac Imaging    ECHOCARDIOGRAM STRESS TEST 08/24/2021    Interpretation Summary  · Below average exercise capacity completing 5 minutes and 14 seconds of Aj exercise protocol, 7 METs workload, without angina.  · Stress ECG does not meet criteria for ischemia. Post-stress echocardiogram does not meet criteria for ischemia.  All ventricular walls become hyperdynamic and the ejection fraction improves.  · Normal-sized LV. Normal LV systolic function. Estimated EF 65- 70%. Normal LV septal wall motion. No regional wall motion abnormalities. Normal LV wall thickness. Normal diastolic filling pattern for age of the LV.    ECG  8/22/2023  Sinus rhythm with left anterior fascicular block    Problem List Items Addressed This Visit        High    Agatston coronary artery calcium score greater than 400    Overview     2014 , 403 in LAD.  SE 9/16 unremarkable    8/21 stress echo negative for ischemia however reduced exercise tolerance         Current Assessment & Plan     Pt remains asymptomatic. He is active, golfs quite a bit but usually rides in the cart. Continue GDMT.         SVT (supraventricular tachycardia) (CMS/HCC) - Primary    Overview     Brief on stress test         Current Assessment & Plan     Denies any arrhythmia symptoms.            Medium    Hypertension, essential    Overview     Overview:          Current Assessment & Plan     Blood pressure is 143/73. Continue present therapy.         Hypercholesterolemia    Overview     2020 LDL 55         Current Assessment & Plan     Most recent lab results reviewed. LDL is 41. Continue present therapy.         Relevant Orders    Comprehensive metabolic panel    Lipid panel    CBC and Differential    Nonrheumatic aortic valve insufficiency    Overview     Mild 2016, 2019, 2021             History of syncope    Overview     2/19 and once in 2018 ? etiology            Low    APC (atrial premature contractions)       This patient note has been dictated using speech recognition software. Inadvertent speech recognition errors should be disregarded. Please do not hesitate to call my office for clarifications.      Carlos Manuel Peña DO, FACC 3/14/2024

## 2024-03-14 ENCOUNTER — OFFICE VISIT (OUTPATIENT)
Dept: CARDIOLOGY | Facility: CLINIC | Age: 79
End: 2024-03-14
Payer: COMMERCIAL

## 2024-03-14 VITALS
HEART RATE: 69 BPM | SYSTOLIC BLOOD PRESSURE: 126 MMHG | DIASTOLIC BLOOD PRESSURE: 70 MMHG | HEIGHT: 67 IN | BODY MASS INDEX: 24.48 KG/M2 | WEIGHT: 156 LBS | OXYGEN SATURATION: 99 %

## 2024-03-14 DIAGNOSIS — I10 HYPERTENSION, ESSENTIAL: ICD-10-CM

## 2024-03-14 DIAGNOSIS — I35.1 NONRHEUMATIC AORTIC VALVE INSUFFICIENCY: ICD-10-CM

## 2024-03-14 DIAGNOSIS — I47.10 SVT (SUPRAVENTRICULAR TACHYCARDIA) (CMS/HCC): Primary | ICD-10-CM

## 2024-03-14 DIAGNOSIS — R93.1 AGATSTON CORONARY ARTERY CALCIUM SCORE GREATER THAN 400: ICD-10-CM

## 2024-03-14 DIAGNOSIS — I49.1 APC (ATRIAL PREMATURE CONTRACTIONS): ICD-10-CM

## 2024-03-14 DIAGNOSIS — E78.00 HYPERCHOLESTEROLEMIA: ICD-10-CM

## 2024-03-14 DIAGNOSIS — Z87.898 HISTORY OF SYNCOPE: ICD-10-CM

## 2024-03-14 PROCEDURE — 3078F DIAST BP <80 MM HG: CPT | Performed by: INTERNAL MEDICINE

## 2024-03-14 PROCEDURE — 3074F SYST BP LT 130 MM HG: CPT | Performed by: INTERNAL MEDICINE

## 2024-03-14 PROCEDURE — 99214 OFFICE O/P EST MOD 30 MIN: CPT | Performed by: INTERNAL MEDICINE

## 2024-03-14 PROCEDURE — 3008F BODY MASS INDEX DOCD: CPT | Performed by: INTERNAL MEDICINE

## 2024-03-14 NOTE — ASSESSMENT & PLAN NOTE
Last stress echo was August,2021.     Aortic Valve Tricuspid aortic valve. Mild aortic valve regurgitation. No significant aortic valve stenosis.

## 2024-03-14 NOTE — LETTER
March 14, 2024                                                                                                                                                                                                                                                                                                               DEREJE Chun DO  [address] [facility]  [place] [id number]    Patient: Quentin Auguste Jr.  YOB: 1945  Date of Visit: 3/14/2024    Dear Dr. Chun:    Thank you for referring Quentin Auguste Jr. to me for evaluation. Below are the relevant portions of my assessment and plan of care.    If you have questions, please do not hesitate to call me. I look forward to following Quentin along with you.         Sincerely,        Carlos Manuel Peña DO        CC: No Recipients          Assessment and Plan:  Problem List Items Addressed This Visit          High    Agatston coronary artery calcium score greater than 400     Pt remains asymptomatic. He is active, golfs quite a bit but usually rides in the cart. Continue GDMT.         SVT (supraventricular tachycardia) (CMS/HCC) - Primary     Denies any arrhythmia symptoms.            Medium    Hypertension, essential     Blood pressure is 143/73. Continue present therapy.         Hypercholesterolemia     Most recent lab results reviewed. LDL is 41. Continue present therapy.         Relevant Orders    Comprehensive metabolic panel    Lipid panel    CBC and Differential    Nonrheumatic aortic valve insufficiency     Last stress echo was August,2021.     Aortic Valve Tricuspid aortic valve. Mild aortic valve regurgitation. No significant aortic valve stenosis.              History of syncope       Low    APC (atrial premature contractions)

## 2024-03-19 ENCOUNTER — APPOINTMENT (OUTPATIENT)
Dept: LAB | Age: 79
End: 2024-03-19
Attending: UROLOGY
Payer: COMMERCIAL

## 2024-03-19 ENCOUNTER — TRANSCRIBE ORDERS (OUTPATIENT)
Dept: LAB | Age: 79
End: 2024-03-19

## 2024-03-19 DIAGNOSIS — N40.1 BENIGN PROSTATIC HYPERPLASIA WITH LOWER URINARY TRACT SYMPTOMS: ICD-10-CM

## 2024-03-19 DIAGNOSIS — C67.9 MALIGNANT NEOPLASM OF BLADDER, UNSPECIFIED: ICD-10-CM

## 2024-03-19 DIAGNOSIS — N40.1 BENIGN PROSTATIC HYPERPLASIA WITH LOWER URINARY TRACT SYMPTOMS: Primary | ICD-10-CM

## 2024-03-19 LAB
ANION GAP SERPL CALC-SCNC: 8 MEQ/L (ref 3–15)
BUN SERPL-MCNC: 22 MG/DL (ref 7–25)
CALCIUM SERPL-MCNC: 9.4 MG/DL (ref 8.6–10.3)
CHLORIDE SERPL-SCNC: 105 MEQ/L (ref 98–107)
CO2 SERPL-SCNC: 28 MEQ/L (ref 21–31)
CREAT SERPL-MCNC: 1 MG/DL (ref 0.7–1.3)
EGFRCR SERPLBLD CKD-EPI 2021: >60 ML/MIN/1.73M*2
GLUCOSE SERPL-MCNC: 116 MG/DL (ref 70–99)
POTASSIUM SERPL-SCNC: 4.1 MEQ/L (ref 3.5–5.1)
PSA SERPL-MCNC: 5.51 NG/ML
SODIUM SERPL-SCNC: 141 MEQ/L (ref 136–145)

## 2024-03-19 PROCEDURE — 84153 ASSAY OF PSA TOTAL: CPT

## 2024-03-19 PROCEDURE — 80048 BASIC METABOLIC PNL TOTAL CA: CPT

## 2024-03-19 PROCEDURE — 36415 COLL VENOUS BLD VENIPUNCTURE: CPT

## 2024-03-28 ENCOUNTER — HOSPITAL ENCOUNTER (OUTPATIENT)
Dept: RADIOLOGY | Age: 79
Discharge: HOME | End: 2024-03-28
Attending: UROLOGY
Payer: COMMERCIAL

## 2024-03-28 DIAGNOSIS — N40.1 BENIGN PROSTATIC HYPERPLASIA WITH LOWER URINARY TRACT SYMPTOMS: ICD-10-CM

## 2024-03-28 DIAGNOSIS — C67.9 MALIGNANT NEOPLASM OF BLADDER, UNSPECIFIED: ICD-10-CM

## 2024-03-28 PROCEDURE — 63600105 HC IODINE BASED CONTRAST: Mod: JZ | Performed by: UROLOGY

## 2024-03-28 PROCEDURE — 74178 CT ABD&PLV WO CNTR FLWD CNTR: CPT

## 2024-03-28 RX ORDER — IOPAMIDOL 755 MG/ML
100 INJECTION, SOLUTION INTRAVASCULAR
Status: COMPLETED | OUTPATIENT
Start: 2024-03-28 | End: 2024-03-28

## 2024-03-28 RX ADMIN — IOPAMIDOL 100 ML: 755 INJECTION, SOLUTION INTRAVENOUS at 13:30

## 2024-03-28 NOTE — PROGRESS NOTES
"Quentin Auguste Jr.   [id number]    Your doctor has referred you for a CT examination that requires the injection of an iodinated contrast material into your bloodstream. This iodinated contrast material, sometimes referred to as \"x-ray dye\" allows for better interpretation of the x-ray films or CT images and results in a more accurate interpretation of the examination.     Without the use of iodinated contrast (x-ray dye), the examination may be less informative and result in a suboptimal examination, and possibly a delay in diagnosis and, if needed, treatment.     The iodinated contrast material is given through a small needle or catheter placed into a vein, usually on the inside of the elbow, on the back of hand, or in a vein in the foot or lower leg.    The most common, though still rare, potential reaction to an intravenous contrast injection is an allergic-like reaction. Most allergic-like reactions are mild, though a small subset of people can have more severe reactions. Mild reactions include mild / scattered hives, itching, scratchy throat, sneezing and nasal congestion. More severe reactions include facial swelling, severe difficulty breathing, wheezing and anaphylactic shock. Those with a prior history allergic-like reaction to the same class of contrast media (such as CT contrast or MRI contrast) are at the highest risk for an allergic reaction.     If you believe you had an allergic reaction to contrast in the past, please let our staff know. We can determine if this increases your risk for a future reaction and provide steps to decrease the risk. This may delay your examination, but it decreases the risk of having a new and possibly more severe reaction to the contrast injection.    People with a history of prior allergic reactions to other substances (such as unrelated medications and food) and patients with a history of asthma have slightly increased risk for an allergic reaction from contrast " material when compared with the general population, but do not require to be pretreated prior to a contrast injection.    You should notify the physician, nurse or technologist if you have ever had any of these conditions or if you have any questions.

## 2024-03-30 DIAGNOSIS — R93.1 AGATSTON CORONARY ARTERY CALCIUM SCORE GREATER THAN 400: ICD-10-CM

## 2024-03-30 DIAGNOSIS — E78.00 HYPERCHOLESTEROLEMIA: ICD-10-CM

## 2024-04-01 RX ORDER — ROSUVASTATIN CALCIUM 20 MG/1
20 TABLET, COATED ORAL DAILY
Qty: 90 TABLET | Refills: 3 | Status: SHIPPED | OUTPATIENT
Start: 2024-04-01 | End: 2025-03-24

## 2024-10-11 ASSESSMENT — ENCOUNTER SYMPTOMS
HEMOPTYSIS: 0
HOARSE VOICE: 0
HALLUCINATIONS: 0
BRIEF PARALYSIS: 0
POLYDIPSIA: 0
WHEEZING: 0
TREMORS: 0
SPUTUM PRODUCTION: 0
HEMATEMESIS: 0
ABDOMINAL PAIN: 0
DIAPHORESIS: 0
BLURRED VISION: 0
DYSURIA: 0
ODYNOPHAGIA: 0
MYALGIAS: 0
ALTERED MENTAL STATUS: 0
FEVER: 0
FOCAL WEAKNESS: 0
FALLS: 0
HEMATOCHEZIA: 0

## 2024-10-11 NOTE — PROGRESS NOTES
Cardiology Outpatient  Progress note    Quentin Auguste Jr. is a 79 y.o. male  who presents for ongoing cardiovascular management..    Summary: Coronary calcium score in 2014 was 405 of which 403 was in the LAD.  2018 stress echo no ischemia.  Normal systolic function.  Brief SVT noted on a stress test.  Hypertension, hypercholesterolemia, APCs, previous syncope. Bladder Cancer treated with BCG bladder infusion.    Interval HX:   Denies angina, exertional dyspnea, orthopnea, palpitation, syncope, near syncope, claudication, focal neurologic symptoms, or medication side effects.  Feeling well.  Plays golf but rides but still doing some exercise.  We did discuss exercise guidelines.  Cardiovascular symptoms.    Past medical, family, social history were reviewed and there has been no significant interval change.       Past Medical History:   Diagnosis Date    APC (atrial premature contractions) 4/8/2018    Atypical chest pain 10/10/2018    Cancer (CMS/HCC)     Bladder cancer treated with BCG    Cataracts, bilateral     Coronary artery disease involving native coronary artery of native heart without angina pectoris 4/8/2018 2014 , 403 in LAD.  SE 9/16 unremarkable    Hypercholesterolemia 4/8/2018    Hypertension, essential 4/8/2018    Overview:     Nonrheumatic aortic valve insufficiency 4/8/2018    Mild 2016  Overview:     SVT (supraventricular tachycardia) (CMS/HCC) 4/8/2018    Brief on stress test    Syncope and collapse 4/30/2019       :   Past Surgical History   Procedure Laterality Date    Back surgery  2001    Hernia repair      Rotator cuff repair Left     Tonsillectomy         Allergies:   Lisinopril    Current Outpatient Medications   Medication Sig Dispense Refill    aspirin 81 mg chewable tablet 81 mg.      cholecalciferol, vitamin D3, 25 mcg (1,000 unit) capsule No SIG Entered      ezetimibe (ZETIA) 10 mg tablet TAKE 1 TABLET BY MOUTH EVERY DAY AT NIGHT 90 tablet 3    metoprolol tartrate  "(LOPRESSOR) 25 mg tablet TAKE 1 TABLET BY MOUTH TWICE A  tablet 2    olmesartan-hydrochlorothiazide (BENICAR HCT) 40-12.5 mg per tablet TAKE 1 TABLET BY MOUTH EVERY DAY 90 tablet 6    omega 3-dha-epa-fish oil 1,000 mg (120 mg-180 mg) capsule No SIG Entered      rosuvastatin (CRESTOR) 20 mg tablet TAKE 1 TABLET BY MOUTH EVERY DAY 90 tablet 3    tadalafiL (CIALIS) 5 mg tablet Take 5 mg by mouth once daily.      zinc 50 mg tablet        No current facility-administered medications for this visit.          Social History     Socioeconomic History    Marital status:      Spouse name: None    Number of children: None    Years of education: None    Highest education level: None   Tobacco Use    Smoking status: Never    Smokeless tobacco: Never   Substance and Sexual Activity    Alcohol use: Yes     Comment: Wine, with meals    Drug use: No          Family History   Problem Relation Name Age of Onset    Heart failure Biological Father         Review of Systems   Constitutional: Negative for diaphoresis and fever.   HENT:  Negative for hoarse voice and odynophagia.    Eyes:  Negative for blurred vision and visual disturbance.   Respiratory:  Negative for hemoptysis, sputum production and wheezing.    Endocrine: Negative for cold intolerance, heat intolerance and polydipsia.   Skin:  Negative for rash.   Musculoskeletal:  Negative for falls, muscle weakness and myalgias.   Gastrointestinal:  Negative for abdominal pain, hematemesis and hematochezia.   Genitourinary:  Negative for dysuria.   Neurological:  Negative for brief paralysis, focal weakness and tremors.   Psychiatric/Behavioral:  Negative for altered mental status and hallucinations.        Objective     Visit Vitals  /72   Pulse 75   Ht 1.702 m (5' 7\")   Wt 71.2 kg (157 lb)   SpO2 98%   BMI 24.59 kg/m²       Wt Readings from Last 3 Encounters:   10/17/24 71.2 kg (157 lb)   03/14/24 70.8 kg (156 lb)   08/22/23 71.2 kg (157 lb)         Physical " Exam  Vitals reviewed.   Constitutional:       Appearance: He is well-developed. He is not diaphoretic.   HENT:      Head: Atraumatic.   Eyes:      General: No scleral icterus.     Conjunctiva/sclera: Conjunctivae normal.   Neck:      Thyroid: No thyromegaly.      Vascular: No carotid bruit or JVD.      Trachea: No tracheal deviation.   Cardiovascular:      Rate and Rhythm: Normal rate and regular rhythm.      Heart sounds: Normal heart sounds. No murmur heard.     No friction rub. No gallop.   Pulmonary:      Effort: Pulmonary effort is normal. No respiratory distress.      Breath sounds: Normal breath sounds. No wheezing or rales.   Abdominal:      General: Bowel sounds are normal. There is no distension.      Palpations: Abdomen is soft.   Musculoskeletal:         General: No tenderness.   Skin:     General: Skin is warm and dry.   Neurological:      Mental Status: He is alert and oriented to person, place, and time.      Cranial Nerves: No cranial nerve deficit.          Labs   Lab Results   Component Value Date    WBC 6.16 10/12/2024    HGB 15.6 10/12/2024    HCT 45.9 10/12/2024     10/12/2024    CHOL 108 10/12/2024    TRIG 86 10/12/2024    HDL 40 10/12/2024    LDLCALC 51 10/12/2024    ALT 14 10/12/2024    AST 17 10/12/2024     10/12/2024    K 4.9 10/12/2024     10/12/2024    CREATININE 1.0 10/12/2024    BUN 19 10/12/2024    CO2 30 10/12/2024    GLUCOSE 93 10/12/2024       Cardiac Imaging    ECHOCARDIOGRAM STRESS TEST 08/24/2021    Interpretation Summary  · Below average exercise capacity completing 5 minutes and 14 seconds of Aj exercise protocol, 7 METs workload, without angina.  · Stress ECG does not meet criteria for ischemia. Post-stress echocardiogram does not meet criteria for ischemia. All ventricular walls become hyperdynamic and the ejection fraction improves.  · Normal-sized LV. Normal LV systolic function. Estimated EF 65- 70%. Normal LV septal wall motion. No regional wall  motion abnormalities. Normal LV wall thickness. Normal diastolic filling pattern for age of the LV.    ECG  10/17/2024  Sinus rhythm rate 70 with incomplete right bundle branch block left anterior fascicular block    Problem List Items Addressed This Visit          High    Agatston coronary artery calcium score greater than 400    Overview     2014 , 403 in LAD.  SE 9/16 unremarkable    8/21 stress echo negative for ischemia however reduced exercise tolerance         Current Assessment & Plan     He remains asymptomatic despite her large burden of atherosclerosis concentrated in the LAD.  Reviewed guideline directed lifestyle medical therapy and check a stress echo next visit.  In addition, reviewed exercise per guidelines as well as diet.         Relevant Orders    Echocardiogram stress test    SVT (supraventricular tachycardia) (CMS/[facility])    Overview     Brief on stress test         Current Assessment & Plan     No symptoms.  No intervention.         Relevant Orders    [facility] MUSE ECG 12 lead (clinic performed) (Completed)       Medium    Hypertension, essential - Primary    Overview     Overview:          Current Assessment & Plan     Today's exam repeat blood pressure by myself manually at the end of the office 136/72.  Yesterday) another office 118/84 and a previous office visit here 126/70.  No change.         Relevant Orders    [facility] MUSE ECG 12 lead (clinic performed) (Completed)    Hypercholesterolemia    Overview     2020 LDL 55         Current Assessment & Plan     LDL 51.  Healthy diet and continue present meds.         Nonrheumatic aortic valve insufficiency    Overview     Mild 2016, 2019, 2021             Current Assessment & Plan     This will be reevaluated on his neck stress echo.         Relevant Orders    [facility] MUSE ECG 12 lead (clinic performed) (Completed)    History of syncope    Overview     2/19 and once in 2018 ? etiology         Current Assessment & Plan     No recurrence.   No change.         Relevant Orders    [facility] MUSE ECG 12 lead (clinic performed) (Completed)       Low    APC (atrial premature contractions)    Relevant Orders    [facility] MUSE ECG 12 lead (clinic performed) (Completed)       This patient note has been dictated using speech recognition software. Inadvertent speech recognition errors should be disregarded. Please do not hesitate to call my office for clarifications.    Carlos Manuel Peña DO, FACC

## 2024-10-12 ENCOUNTER — APPOINTMENT (OUTPATIENT)
Dept: LAB | Age: 79
End: 2024-10-12
Attending: INTERNAL MEDICINE
Payer: COMMERCIAL

## 2024-10-12 DIAGNOSIS — E78.00 HYPERCHOLESTEROLEMIA: ICD-10-CM

## 2024-10-12 LAB
ALBUMIN SERPL-MCNC: 4.2 G/DL (ref 3.5–5.7)
ALP SERPL-CCNC: 60 IU/L (ref 34–125)
ALT SERPL-CCNC: 14 IU/L (ref 7–52)
ANION GAP SERPL CALC-SCNC: 4 MEQ/L (ref 3–15)
AST SERPL-CCNC: 17 IU/L (ref 13–39)
BASOPHILS # BLD: 0.03 K/UL (ref 0.01–0.1)
BASOPHILS NFR BLD: 0.5 %
BILIRUB SERPL-MCNC: 1.1 MG/DL (ref 0.3–1.2)
BUN SERPL-MCNC: 19 MG/DL (ref 7–25)
CALCIUM SERPL-MCNC: 9.5 MG/DL (ref 8.6–10.3)
CHLORIDE SERPL-SCNC: 106 MEQ/L (ref 98–107)
CHOLEST SERPL-MCNC: 108 MG/DL
CO2 SERPL-SCNC: 30 MEQ/L (ref 21–31)
CREAT SERPL-MCNC: 1 MG/DL (ref 0.7–1.3)
DIFFERENTIAL METHOD BLD: NORMAL
EGFRCR SERPLBLD CKD-EPI 2021: >60 ML/MIN/1.73M*2
EOSINOPHIL # BLD: 0.2 K/UL (ref 0.04–0.54)
EOSINOPHIL NFR BLD: 3.2 %
ERYTHROCYTE [DISTWIDTH] IN BLOOD BY AUTOMATED COUNT: 13.7 % (ref 11.6–14.4)
GLUCOSE SERPL-MCNC: 93 MG/DL (ref 70–99)
HCT VFR BLD AUTO: 45.9 % (ref 40.1–51)
HDLC SERPL-MCNC: 40 MG/DL
HDLC SERPL: 2.7 {RATIO}
HGB BLD-MCNC: 15.6 G/DL (ref 13.7–17.5)
IMM GRANULOCYTES # BLD AUTO: 0.04 K/UL (ref 0–0.08)
IMM GRANULOCYTES NFR BLD AUTO: 0.6 %
LDLC SERPL CALC-MCNC: 51 MG/DL
LYMPHOCYTES # BLD: 1.57 K/UL (ref 1.2–3.5)
LYMPHOCYTES NFR BLD: 25.5 %
MCH RBC QN AUTO: 30.9 PG (ref 28–33.2)
MCHC RBC AUTO-ENTMCNC: 34 G/DL (ref 32.2–36.5)
MCV RBC AUTO: 90.9 FL (ref 83–98)
MONOCYTES # BLD: 0.52 K/UL (ref 0.3–1)
MONOCYTES NFR BLD: 8.4 %
NEUTROPHILS # BLD: 3.8 K/UL (ref 1.7–7)
NEUTS SEG NFR BLD: 61.8 %
NONHDLC SERPL-MCNC: 68 MG/DL
NRBC BLD-RTO: 0 %
PLATELET # BLD AUTO: 167 K/UL (ref 150–350)
PMV BLD AUTO: 9.4 FL (ref 9.4–12.4)
POTASSIUM SERPL-SCNC: 4.9 MEQ/L (ref 3.5–5.1)
PROT SERPL-MCNC: 6.9 G/DL (ref 6–8.2)
RBC # BLD AUTO: 5.05 M/UL (ref 4.5–5.8)
SODIUM SERPL-SCNC: 140 MEQ/L (ref 136–145)
TRIGL SERPL-MCNC: 86 MG/DL
WBC # BLD AUTO: 6.16 K/UL (ref 3.8–10.5)

## 2024-10-12 PROCEDURE — 80053 COMPREHEN METABOLIC PANEL: CPT

## 2024-10-12 PROCEDURE — 85025 COMPLETE CBC W/AUTO DIFF WBC: CPT

## 2024-10-12 PROCEDURE — 80061 LIPID PANEL: CPT

## 2024-10-12 PROCEDURE — 36415 COLL VENOUS BLD VENIPUNCTURE: CPT

## 2024-10-17 ENCOUNTER — OFFICE VISIT (OUTPATIENT)
Dept: CARDIOLOGY | Facility: CLINIC | Age: 79
End: 2024-10-17
Payer: COMMERCIAL

## 2024-10-17 VITALS
SYSTOLIC BLOOD PRESSURE: 136 MMHG | WEIGHT: 157 LBS | BODY MASS INDEX: 24.64 KG/M2 | HEART RATE: 75 BPM | OXYGEN SATURATION: 98 % | HEIGHT: 67 IN | DIASTOLIC BLOOD PRESSURE: 72 MMHG

## 2024-10-17 DIAGNOSIS — Z87.898 HISTORY OF SYNCOPE: ICD-10-CM

## 2024-10-17 DIAGNOSIS — E78.00 HYPERCHOLESTEROLEMIA: ICD-10-CM

## 2024-10-17 DIAGNOSIS — I49.1 APC (ATRIAL PREMATURE CONTRACTIONS): ICD-10-CM

## 2024-10-17 DIAGNOSIS — R93.1 AGATSTON CORONARY ARTERY CALCIUM SCORE GREATER THAN 400: ICD-10-CM

## 2024-10-17 DIAGNOSIS — I35.1 NONRHEUMATIC AORTIC VALVE INSUFFICIENCY: ICD-10-CM

## 2024-10-17 DIAGNOSIS — I10 HYPERTENSION, ESSENTIAL: Primary | ICD-10-CM

## 2024-10-17 DIAGNOSIS — I47.10 SVT (SUPRAVENTRICULAR TACHYCARDIA) (CMS/HCC): ICD-10-CM

## 2024-10-17 LAB
ATRIAL RATE: 70
P AXIS: 68
PR INTERVAL: 186
QRS DURATION: 112
QT INTERVAL: 380
QTC CALCULATION(BAZETT): 410
R AXIS: -62
T WAVE AXIS: 59
VENTRICULAR RATE: 70

## 2024-10-17 PROCEDURE — 99214 OFFICE O/P EST MOD 30 MIN: CPT | Performed by: INTERNAL MEDICINE

## 2024-10-17 PROCEDURE — 3008F BODY MASS INDEX DOCD: CPT | Performed by: INTERNAL MEDICINE

## 2024-10-17 PROCEDURE — 93000 ELECTROCARDIOGRAM COMPLETE: CPT | Performed by: INTERNAL MEDICINE

## 2024-10-17 PROCEDURE — 3075F SYST BP GE 130 - 139MM HG: CPT | Performed by: INTERNAL MEDICINE

## 2024-10-17 PROCEDURE — 3078F DIAST BP <80 MM HG: CPT | Performed by: INTERNAL MEDICINE

## 2024-10-17 NOTE — ASSESSMENT & PLAN NOTE
He remains asymptomatic despite her large burden of atherosclerosis concentrated in the LAD.  Reviewed guideline directed lifestyle medical therapy and check a stress echo next visit.  In addition, reviewed exercise per guidelines as well as diet.

## 2024-10-17 NOTE — ASSESSMENT & PLAN NOTE
Today's exam repeat blood pressure by myself manually at the end of the office 136/72.  Yesterday) another office 118/84 and a previous office visit here 126/70.  No change.

## 2024-11-15 DIAGNOSIS — I47.10 SVT (SUPRAVENTRICULAR TACHYCARDIA) (CMS/HCC): ICD-10-CM

## 2024-11-15 DIAGNOSIS — I10 HYPERTENSION, ESSENTIAL: ICD-10-CM

## 2024-11-15 DIAGNOSIS — I49.1 APC (ATRIAL PREMATURE CONTRACTIONS): ICD-10-CM

## 2024-11-15 RX ORDER — METOPROLOL TARTRATE 25 MG/1
25 TABLET, FILM COATED ORAL 2 TIMES DAILY
Qty: 180 TABLET | Refills: 2 | Status: SHIPPED | OUTPATIENT
Start: 2024-11-15

## 2024-11-23 DIAGNOSIS — I10 HYPERTENSION, ESSENTIAL: Primary | ICD-10-CM

## 2024-11-25 RX ORDER — OLMESARTAN MEDOXOMIL AND HYDROCHLOROTHIAZIDE 40/12.5 40; 12.5 MG/1; MG/1
TABLET ORAL
Qty: 90 TABLET | Refills: 6 | Status: SHIPPED | OUTPATIENT
Start: 2024-11-25

## 2024-12-06 DIAGNOSIS — E78.00 HYPERCHOLESTEROLEMIA: ICD-10-CM

## 2024-12-06 RX ORDER — EZETIMIBE 10 MG/1
TABLET ORAL
Qty: 90 TABLET | Refills: 3 | OUTPATIENT
Start: 2024-12-06

## 2024-12-06 RX ORDER — EZETIMIBE 10 MG/1
10 TABLET ORAL SEE ADMIN INSTRUCTIONS
Qty: 90 TABLET | Refills: 3 | Status: SHIPPED | OUTPATIENT
Start: 2024-12-06

## 2025-03-23 DIAGNOSIS — R93.1 AGATSTON CORONARY ARTERY CALCIUM SCORE GREATER THAN 400: ICD-10-CM

## 2025-03-23 DIAGNOSIS — E78.00 HYPERCHOLESTEROLEMIA: ICD-10-CM

## 2025-03-24 RX ORDER — ROSUVASTATIN CALCIUM 20 MG/1
20 TABLET, COATED ORAL DAILY
Qty: 90 TABLET | Refills: 3 | Status: SHIPPED | OUTPATIENT
Start: 2025-03-24

## 2025-04-10 ENCOUNTER — TRANSCRIBE ORDERS (OUTPATIENT)
Dept: SCHEDULING | Age: 80
End: 2025-04-10

## 2025-04-10 ENCOUNTER — APPOINTMENT (OUTPATIENT)
Dept: LAB | Age: 80
End: 2025-04-10
Attending: UROLOGY
Payer: COMMERCIAL

## 2025-04-10 DIAGNOSIS — C67.9 BLADDER CANCER (CMS/HCC): ICD-10-CM

## 2025-04-10 DIAGNOSIS — Z85.51 PERSONAL HISTORY OF MALIGNANT NEOPLASM OF BLADDER: ICD-10-CM

## 2025-04-10 DIAGNOSIS — I88.9 ADENITIS: ICD-10-CM

## 2025-04-10 DIAGNOSIS — Z85.51 PERSONAL HISTORY OF MALIGNANT NEOPLASM OF BLADDER: Primary | ICD-10-CM

## 2025-04-10 LAB — PSA SERPL-MCNC: 5.63 NG/ML

## 2025-04-10 PROCEDURE — 36415 COLL VENOUS BLD VENIPUNCTURE: CPT

## 2025-04-10 PROCEDURE — 84153 ASSAY OF PSA TOTAL: CPT

## 2025-04-16 ENCOUNTER — TELEPHONE (OUTPATIENT)
Dept: SCHEDULING | Facility: CLINIC | Age: 80
End: 2025-04-16
Payer: COMMERCIAL

## 2025-05-13 ENCOUNTER — APPOINTMENT (OUTPATIENT)
Dept: LAB | Age: 80
End: 2025-05-13
Attending: UROLOGY
Payer: COMMERCIAL

## 2025-05-13 ENCOUNTER — TRANSCRIBE ORDERS (OUTPATIENT)
Dept: SCHEDULING | Age: 80
End: 2025-05-13

## 2025-05-13 DIAGNOSIS — Z85.51 PERSONAL HISTORY OF MALIGNANT NEOPLASM OF BLADDER: Primary | ICD-10-CM

## 2025-05-13 DIAGNOSIS — Z85.51 PERSONAL HISTORY OF MALIGNANT NEOPLASM OF BLADDER: ICD-10-CM

## 2025-05-13 LAB
BUN SERPL-MCNC: 25 MG/DL (ref 7–25)
CREAT SERPL-MCNC: 1.1 MG/DL (ref 0.7–1.3)
EGFRCR SERPLBLD CKD-EPI 2021: >60 ML/MIN/1.73M*2

## 2025-05-13 PROCEDURE — 36415 COLL VENOUS BLD VENIPUNCTURE: CPT

## 2025-05-13 PROCEDURE — 82565 ASSAY OF CREATININE: CPT

## 2025-05-13 PROCEDURE — 84520 ASSAY OF UREA NITROGEN: CPT

## 2025-05-15 ENCOUNTER — HOSPITAL ENCOUNTER (OUTPATIENT)
Dept: RADIOLOGY | Age: 80
Discharge: HOME | End: 2025-05-15
Attending: UROLOGY
Payer: COMMERCIAL

## 2025-05-15 ENCOUNTER — HOSPITAL ENCOUNTER (OUTPATIENT)
Dept: CARDIOLOGY | Facility: CLINIC | Age: 80
Discharge: HOME | End: 2025-05-15
Attending: INTERNAL MEDICINE
Payer: COMMERCIAL

## 2025-05-15 ENCOUNTER — OFFICE VISIT (OUTPATIENT)
Dept: CARDIOLOGY | Facility: CLINIC | Age: 80
End: 2025-05-15
Payer: COMMERCIAL

## 2025-05-15 VITALS
WEIGHT: 153 LBS | SYSTOLIC BLOOD PRESSURE: 132 MMHG | HEIGHT: 67 IN | RESPIRATION RATE: 16 BRPM | DIASTOLIC BLOOD PRESSURE: 72 MMHG | BODY MASS INDEX: 24.01 KG/M2 | HEART RATE: 78 BPM | OXYGEN SATURATION: 98 %

## 2025-05-15 VITALS — HEIGHT: 67 IN | WEIGHT: 153 LBS | BODY MASS INDEX: 24.01 KG/M2

## 2025-05-15 DIAGNOSIS — I10 HYPERTENSION, ESSENTIAL: ICD-10-CM

## 2025-05-15 DIAGNOSIS — R93.1 AGATSTON CORONARY ARTERY CALCIUM SCORE GREATER THAN 400: ICD-10-CM

## 2025-05-15 DIAGNOSIS — I35.1 NONRHEUMATIC AORTIC VALVE INSUFFICIENCY: ICD-10-CM

## 2025-05-15 DIAGNOSIS — I88.9 ADENITIS: ICD-10-CM

## 2025-05-15 DIAGNOSIS — I49.1 APC (ATRIAL PREMATURE CONTRACTIONS): ICD-10-CM

## 2025-05-15 DIAGNOSIS — R93.1 AGATSTON CORONARY ARTERY CALCIUM SCORE GREATER THAN 400: Primary | ICD-10-CM

## 2025-05-15 DIAGNOSIS — E78.00 HYPERCHOLESTEROLEMIA: ICD-10-CM

## 2025-05-15 DIAGNOSIS — Z87.898 HISTORY OF SYNCOPE: ICD-10-CM

## 2025-05-15 DIAGNOSIS — C67.9 BLADDER CANCER (CMS/HCC): ICD-10-CM

## 2025-05-15 DIAGNOSIS — I47.10 SVT (SUPRAVENTRICULAR TACHYCARDIA) (CMS/HCC): ICD-10-CM

## 2025-05-15 PROBLEM — R82.89 ABNORMAL URINE CYTOLOGY: Status: ACTIVE | Noted: 2021-07-28

## 2025-05-15 PROBLEM — N40.0 BPH (BENIGN PROSTATIC HYPERPLASIA): Status: ACTIVE | Noted: 2021-07-23

## 2025-05-15 PROBLEM — I44.4 LAFB (LEFT ANTERIOR FASCICULAR BLOCK): Status: ACTIVE | Noted: 2021-07-23

## 2025-05-15 PROCEDURE — 93320 DOPPLER ECHO COMPLETE: CPT | Performed by: INTERNAL MEDICINE

## 2025-05-15 PROCEDURE — G2211 COMPLEX E/M VISIT ADD ON: HCPCS | Performed by: INTERNAL MEDICINE

## 2025-05-15 PROCEDURE — 99214 OFFICE O/P EST MOD 30 MIN: CPT | Performed by: INTERNAL MEDICINE

## 2025-05-15 PROCEDURE — 3008F BODY MASS INDEX DOCD: CPT | Performed by: INTERNAL MEDICINE

## 2025-05-15 PROCEDURE — 74178 CT ABD&PLV WO CNTR FLWD CNTR: CPT

## 2025-05-15 PROCEDURE — 3078F DIAST BP <80 MM HG: CPT | Performed by: INTERNAL MEDICINE

## 2025-05-15 PROCEDURE — 93351 STRESS TTE COMPLETE: CPT | Performed by: INTERNAL MEDICINE

## 2025-05-15 PROCEDURE — 3075F SYST BP GE 130 - 139MM HG: CPT | Performed by: INTERNAL MEDICINE

## 2025-05-15 PROCEDURE — 93325 DOPPLER ECHO COLOR FLOW MAPG: CPT | Performed by: INTERNAL MEDICINE

## 2025-05-15 PROCEDURE — 63600105 HC IODINE BASED CONTRAST: Mod: JZ | Performed by: UROLOGY

## 2025-05-15 RX ORDER — IOPAMIDOL 755 MG/ML
100 INJECTION, SOLUTION INTRAVASCULAR
Status: COMPLETED | OUTPATIENT
Start: 2025-05-15 | End: 2025-05-15

## 2025-05-15 RX ADMIN — IOPAMIDOL 100 ML: 755 INJECTION, SOLUTION INTRAVENOUS at 13:01

## 2025-05-16 ENCOUNTER — PATIENT OUTREACH (OUTPATIENT)
Dept: HEMATOLOGY/ONCOLOGY | Facility: HOSPITAL | Age: 80
End: 2025-05-16
Payer: COMMERCIAL

## 2025-05-16 PROBLEM — R91.1 INCIDENTAL LUNG NODULE: Status: ACTIVE | Noted: 2025-05-15

## 2025-05-16 LAB
AORTIC ROOT ANNULUS: 3.2 CM
AORTIC VALVE MEAN VELOCITY: 0.64 M/S
AORTIC VALVE VELOCITY TIME INTEGRAL: 23.5 CM
ASCENDING AORTA: 3.2 CM
AV MEAN GRADIENT: 2 MMHG
AV PEAK GRADIENT: 4 MMHG
AV PEAK VELOCITY-S: 1.05 M/S
AV REG PEAK VEL: 2.89 M/S
AV REGURGITATION PRESSURE HALF TIME: 623 MS
AV VALVE AREA INDEX: 1.47
AV VALVE AREA: 2.17 CM2
AV VELOCITY RATIO: 0.77
AVA (VTI): 2.65 CM2
BSA FOR ECHO PROCEDURE: 1.81 M2
CUSP SEPARATION: 1.8 CM
DOP CALC LVOT STROKE VOLUME: 62.31 CM3
E WAVE DECELERATION TIME: 231 MS
E/A RATIO: 1.2
E/E' RATIO: 7.9
E/LAT E' RATIO: 9.2
EDV (BP): 71 CM3
EF (A4C): 63.1 %
EF A2C: 65.3 %
EJECTION FRACTION: 64.2 %
EST RIGHT VENT SYSTOLIC PRESSURE BY TRICUSPID REGURGITATION JET: 23 MMHG
ESV (BP): 25.4 CM3
FRACTIONAL SHORTENING: 33.26 %
INTERVENTRICULAR SEPTUM: 0.78 CM
LA ESV INDEX (A2C): 13.48 CM3/M2
LAAS-AP2: 9.52 CM2
LAAS-AP4: 11.5 CM2
LAL MED-LAT (A4C): 4.87 CM
LAV-S: 24.4 CM3
LEFT ATRIAL LENGTH SUPERIOR-INFERIOR (APICAL 2-CHAMBER VIEW): 3 CM
LEFT ATRIUM VOLUME INDEX: 12.93 CM3/M2
LEFT ATRIUM VOLUME: 23.4 CM3
LEFT INTERNAL DIMENSION IN SYSTOLE: 3.25 CM (ref 2.5–3.78)
LEFT VENTRICLE DIASTOLIC VOLUME INDEX: 35.08 CM3/M2
LEFT VENTRICLE DIASTOLIC VOLUME: 63.5 CM3
LEFT VENTRICLE SYSTOLIC VOLUME INDEX: 12.93 CM3/M2
LEFT VENTRICLE SYSTOLIC VOLUME: 23.4 CM3
LEFT VENTRICULAR INTERNAL DIMENSION IN DIASTOLE: 4.87 CM (ref 4.23–5.87)
LEFT VENTRICULAR POSTERIOR WALL IN END DIASTOLE: 0.85 CM (ref 0.55–1.03)
LV DIASTOLIC VOLUME: 69.2 CM3
LV ESV (APICAL 2 CHAMBER): 24 CM3
LVAD-AP2: 23.5 CM2
LVAD-AP4: 24 CM2
LVAS-AP2: 12.3 CM2
LVAS-AP4: 13.2 CM2
LVEDVI(A2C): 38.23 CM3/M2
LVEDVI(BP): 39.23 CM3/M2
LVESVI(A2C): 13.26 CM3/M2
LVESVI(BP): 14.03 CM3/M2
LVLD-AP2: 6.61 CM
LVLD-AP4: 7.65 CM
LVLS-AP2: 5.35 CM
LVLS-AP4: 6.22 CM
LVOT 2D: 2.1 CM
LVOT A: 3.46 CM2
LVOT MG: 1 MMHG
LVOT MV: 0.56 M/S
LVOT PEAK VELOCITY: 0.84 M/S
LVOT PG: 3 MMHG
LVOT STROKE VOLUME INDEX: 34.43 ML/M2
LVOT VTI: 18 CM
MLH CV ECHO AVA INDEX VELOCITY RATIO: 1.2
MV E'TISSUE VEL-LAT: 0.08 M/S
MV E'TISSUE VEL-MED: 0.09 M/S
MV PEAK A VEL: 0.6 M/S
MV PEAK E VEL: 0.72 M/S
MV STENOSIS PRESSURE HALF TIME: 68 MS
MV VALVE AREA BY CONTINUITY EQUATION: 2.31 CM2
MV VALVE AREA P 1/2 METHOD: 3.24 CM2
MV VTI: 27 CM
PISA AR MAX VEL: 2.78 M/S
POSTERIOR WALL: 0.85 CM
PULMONARY REGURGITATION LATE DIASTOLIC VELOCITY: 1.32 M/S
PV PEAK GRADIENT: 5 MMHG
PV PV: 1.07 M/S
RAP: 3 MMHG
RVOT VMAX: 0.88 M/S
SEPTAL TISSUE DOPPLER FREE WALL LATE DIA VELOCITY (APICAL 4 CHAMBER VIEW): 0.19 M/S
STRESS BASELINE BP: NORMAL MMHG
STRESS BASELINE HR: 67 BPM
STRESS ECHO POST RECOVERY HR: 109 BPM
STRESS O2 SAT REST: 97 %
STRESS PERCENT HR: 99 %
STRESS POST ESTIMATED WORKLOAD: 6.4 METS
STRESS POST EXERCISE DUR MIN: 3 MIN
STRESS POST EXERCISE DUR SEC: 48 SEC
STRESS POST O2 SAT PEAK: 97 %
STRESS POST PEAK BP: NORMAL MMHG
STRESS POST PEAK HR: 139 BPM
STRESS TARGET HR: 119 BPM
TR MAX PG: 19.89 MMHG
TRICUSPID VALVE PEAK REGURGITATION VELOCITY: 2.23 M/S
Z-SCORE OF LEFT VENTRICULAR DIMENSION IN END DIASTOLE: -0.22
Z-SCORE OF LEFT VENTRICULAR DIMENSION IN END SYSTOLE: 0.44
Z-SCORE OF LEFT VENTRICULAR POSTERIOR WALL IN END DIASTOLE: 0.62

## 2025-06-01 DIAGNOSIS — I10 HYPERTENSION, ESSENTIAL: ICD-10-CM

## 2025-06-01 DIAGNOSIS — I49.1 APC (ATRIAL PREMATURE CONTRACTIONS): ICD-10-CM

## 2025-06-01 DIAGNOSIS — I47.10 SVT (SUPRAVENTRICULAR TACHYCARDIA) (CMS/HCC): ICD-10-CM

## 2025-06-02 RX ORDER — METOPROLOL TARTRATE 25 MG/1
25 TABLET, FILM COATED ORAL 2 TIMES DAILY
Qty: 180 TABLET | Refills: 2 | Status: SHIPPED | OUTPATIENT
Start: 2025-06-02

## 2025-07-16 ENCOUNTER — APPOINTMENT (OUTPATIENT)
Dept: LAB | Age: 80
End: 2025-07-16
Attending: INTERNAL MEDICINE
Payer: COMMERCIAL

## 2025-07-16 DIAGNOSIS — I10 HYPERTENSION, ESSENTIAL: ICD-10-CM

## 2025-07-16 DIAGNOSIS — I10 ESSENTIAL HYPERTENSION, BENIGN: ICD-10-CM

## 2025-07-16 DIAGNOSIS — R93.1 AGATSTON CORONARY ARTERY CALCIUM SCORE GREATER THAN 400: ICD-10-CM

## 2025-07-16 LAB
ALBUMIN SERPL-MCNC: 4.2 G/DL (ref 3.5–5.7)
ALP SERPL-CCNC: 52 IU/L (ref 34–125)
ALT SERPL-CCNC: 15 IU/L (ref 7–52)
ANION GAP SERPL CALC-SCNC: 6 MEQ/L (ref 3–15)
AST SERPL-CCNC: 18 IU/L (ref 13–39)
BASOPHILS # BLD: 0.03 K/UL (ref 0.01–0.1)
BASOPHILS NFR BLD: 0.5 %
BILIRUB SERPL-MCNC: 1.2 MG/DL (ref 0.3–1.2)
BUN SERPL-MCNC: 18 MG/DL (ref 7–25)
CALCIUM SERPL-MCNC: 9.2 MG/DL (ref 8.6–10.3)
CHLORIDE SERPL-SCNC: 106 MEQ/L (ref 98–107)
CHOLEST SERPL-MCNC: 109 MG/DL
CO2 SERPL-SCNC: 29 MEQ/L (ref 21–31)
CREAT SERPL-MCNC: 1 MG/DL (ref 0.7–1.3)
CREAT UR-MCNC: 86.1 MG/DL
DIFFERENTIAL METHOD BLD: ABNORMAL
EGFRCR SERPLBLD CKD-EPI 2021: >60 ML/MIN/1.73M*2
EOSINOPHIL # BLD: 0.26 K/UL (ref 0.04–0.54)
EOSINOPHIL NFR BLD: 4.3 %
ERYTHROCYTE [DISTWIDTH] IN BLOOD BY AUTOMATED COUNT: 13.6 % (ref 11.6–14.4)
EST. AVERAGE GLUCOSE BLD GHB EST-MCNC: 100 MG/DL
GLUCOSE SERPL-MCNC: 92 MG/DL (ref 70–99)
HBA1C MFR BLD: 5.1 %
HCT VFR BLD AUTO: 42.9 % (ref 40.1–51)
HDLC SERPL-MCNC: 37 MG/DL
HDLC SERPL: 2.9 {RATIO}
HGB BLD-MCNC: 14.7 G/DL (ref 13.7–17.5)
IMM GRANULOCYTES # BLD AUTO: 0.04 K/UL (ref 0–0.08)
IMM GRANULOCYTES NFR BLD AUTO: 0.7 %
LDLC SERPL CALC-MCNC: 54 MG/DL
LYMPHOCYTES # BLD: 1.4 K/UL (ref 1.2–3.5)
LYMPHOCYTES NFR BLD: 23.1 %
MCH RBC QN AUTO: 30.3 PG (ref 28–33.2)
MCHC RBC AUTO-ENTMCNC: 34.3 G/DL (ref 32.2–36.5)
MCV RBC AUTO: 88.5 FL (ref 83–98)
MICROALBUMIN UR-MCNC: 2.5 MG/L
MICROALBUMIN/CREAT UR: 2.9 UG/MG
MONOCYTES # BLD: 0.58 K/UL (ref 0.3–1)
MONOCYTES NFR BLD: 9.6 %
NEUTROPHILS # BLD: 3.76 K/UL (ref 1.7–7)
NEUTS SEG NFR BLD: 61.8 %
NONHDLC SERPL-MCNC: 72 MG/DL
NRBC BLD-RTO: 0 %
PLATELET # BLD AUTO: 157 K/UL (ref 150–350)
PMV BLD AUTO: 9.3 FL (ref 9.4–12.4)
POTASSIUM SERPL-SCNC: 4.2 MEQ/L (ref 3.5–5.1)
PROT SERPL-MCNC: 6.5 G/DL (ref 6–8.2)
RBC # BLD AUTO: 4.85 M/UL (ref 4.5–5.8)
SODIUM SERPL-SCNC: 141 MEQ/L (ref 136–145)
TRIGL SERPL-MCNC: 92 MG/DL
WBC # BLD AUTO: 6.07 K/UL (ref 3.8–10.5)

## 2025-07-16 PROCEDURE — 85025 COMPLETE CBC W/AUTO DIFF WBC: CPT

## 2025-07-16 PROCEDURE — 80053 COMPREHEN METABOLIC PANEL: CPT

## 2025-07-16 PROCEDURE — 36415 COLL VENOUS BLD VENIPUNCTURE: CPT

## 2025-07-16 PROCEDURE — 82043 UR ALBUMIN QUANTITATIVE: CPT

## 2025-07-16 PROCEDURE — 80061 LIPID PANEL: CPT

## 2025-07-16 PROCEDURE — 83036 HEMOGLOBIN GLYCOSYLATED A1C: CPT

## 2025-07-24 ENCOUNTER — RESULTS FOLLOW-UP (OUTPATIENT)
Dept: CARDIOLOGY | Facility: CLINIC | Age: 80
End: 2025-07-24
Payer: COMMERCIAL

## 2025-07-24 NOTE — TELEPHONE ENCOUNTER
Left message on patient VM with normal lab results.  Advised to continue meds and heart healthy diet and exercise.  Asked for callback if there are any questions.